# Patient Record
Sex: FEMALE | Race: BLACK OR AFRICAN AMERICAN | NOT HISPANIC OR LATINO | Employment: UNEMPLOYED
[De-identification: names, ages, dates, MRNs, and addresses within clinical notes are randomized per-mention and may not be internally consistent; named-entity substitution may affect disease eponyms.]

---

## 2017-11-12 ENCOUNTER — HEALTH MAINTENANCE LETTER (OUTPATIENT)
Age: 37
End: 2017-11-12

## 2018-11-19 ENCOUNTER — HEALTH MAINTENANCE LETTER (OUTPATIENT)
Age: 38
End: 2018-11-19

## 2024-10-31 ENCOUNTER — APPOINTMENT (OUTPATIENT)
Dept: CARDIOLOGY | Facility: CLINIC | Age: 44
DRG: 064 | End: 2024-10-31
Attending: NURSE PRACTITIONER
Payer: MEDICARE

## 2024-10-31 ENCOUNTER — APPOINTMENT (OUTPATIENT)
Dept: GENERAL RADIOLOGY | Facility: CLINIC | Age: 44
DRG: 064 | End: 2024-10-31
Payer: MEDICARE

## 2024-10-31 ENCOUNTER — APPOINTMENT (OUTPATIENT)
Dept: CT IMAGING | Facility: CLINIC | Age: 44
DRG: 064 | End: 2024-10-31
Attending: EMERGENCY MEDICINE
Payer: MEDICARE

## 2024-10-31 ENCOUNTER — APPOINTMENT (OUTPATIENT)
Dept: SPEECH THERAPY | Facility: CLINIC | Age: 44
DRG: 064 | End: 2024-10-31
Attending: NURSE PRACTITIONER
Payer: MEDICARE

## 2024-10-31 ENCOUNTER — HOSPITAL ENCOUNTER (INPATIENT)
Facility: CLINIC | Age: 44
LOS: 1 days | Discharge: HOME HEALTH | DRG: 064 | End: 2024-11-01
Attending: PSYCHIATRY & NEUROLOGY | Admitting: PSYCHIATRY & NEUROLOGY
Payer: MEDICARE

## 2024-10-31 ENCOUNTER — HOSPITAL ENCOUNTER (EMERGENCY)
Facility: CLINIC | Age: 44
Discharge: OTHER INSTITUTION | DRG: 064 | End: 2024-10-31
Attending: EMERGENCY MEDICINE | Admitting: EMERGENCY MEDICINE
Payer: MEDICARE

## 2024-10-31 ENCOUNTER — APPOINTMENT (OUTPATIENT)
Dept: GENERAL RADIOLOGY | Facility: CLINIC | Age: 44
DRG: 064 | End: 2024-10-31
Attending: NURSE PRACTITIONER
Payer: MEDICARE

## 2024-10-31 ENCOUNTER — APPOINTMENT (OUTPATIENT)
Dept: MRI IMAGING | Facility: CLINIC | Age: 44
DRG: 064 | End: 2024-10-31
Attending: NURSE PRACTITIONER
Payer: MEDICARE

## 2024-10-31 VITALS
HEART RATE: 112 BPM | TEMPERATURE: 97.6 F | DIASTOLIC BLOOD PRESSURE: 82 MMHG | RESPIRATION RATE: 15 BRPM | OXYGEN SATURATION: 96 % | SYSTOLIC BLOOD PRESSURE: 136 MMHG

## 2024-10-31 DIAGNOSIS — E78.5 HYPERLIPIDEMIA, UNSPECIFIED HYPERLIPIDEMIA TYPE: ICD-10-CM

## 2024-10-31 DIAGNOSIS — I61.9 INTRAPARENCHYMAL HEMORRHAGE OF BRAIN (H): Primary | ICD-10-CM

## 2024-10-31 DIAGNOSIS — I63.81 THALAMIC STROKE (H): ICD-10-CM

## 2024-10-31 DIAGNOSIS — I10 HYPERTENSION GOAL BP (BLOOD PRESSURE) < 140/90: ICD-10-CM

## 2024-10-31 DIAGNOSIS — I61.9 HEMORRHAGIC STROKE (H): ICD-10-CM

## 2024-10-31 PROBLEM — H16.143 SUPERFICIAL PUNCTATE KERATITIS OF BOTH EYES: Status: ACTIVE | Noted: 2024-03-19

## 2024-10-31 PROBLEM — N93.9 ABNORMAL UTERINE BLEEDING (AUB): Status: ACTIVE | Noted: 2021-10-21

## 2024-10-31 PROBLEM — I69.119: Status: ACTIVE | Noted: 2017-08-08

## 2024-10-31 PROBLEM — H50.10 EXOTROPIA: Status: ACTIVE | Noted: 2024-03-19

## 2024-10-31 PROBLEM — Z97.5 IUD (INTRAUTERINE DEVICE) IN PLACE: Status: ACTIVE | Noted: 2020-06-21

## 2024-10-31 PROBLEM — E66.9 TYPE 2 DIABETES MELLITUS WITH OBESITY (H): Status: ACTIVE | Noted: 2022-07-11

## 2024-10-31 PROBLEM — E11.69 TYPE 2 DIABETES MELLITUS WITH OBESITY (H): Status: ACTIVE | Noted: 2022-07-11

## 2024-10-31 PROBLEM — N85.01 SIMPLE ENDOMETRIAL HYPERPLASIA: Status: ACTIVE | Noted: 2020-03-11

## 2024-10-31 LAB
AMPHETAMINES UR QL SCN: NORMAL
ANION GAP SERPL CALCULATED.3IONS-SCNC: 11 MMOL/L (ref 7–15)
ANION GAP SERPL CALCULATED.3IONS-SCNC: 13 MMOL/L (ref 7–15)
ATRIAL RATE - MUSE: 118 BPM
ATRIAL RATE - MUSE: 68 BPM
BARBITURATES UR QL SCN: NORMAL
BASOPHILS # BLD AUTO: 0.1 10E3/UL (ref 0–0.2)
BASOPHILS NFR BLD AUTO: 1 %
BENZODIAZ UR QL SCN: NORMAL
BUN SERPL-MCNC: 11.6 MG/DL (ref 6–20)
BUN SERPL-MCNC: 8.2 MG/DL (ref 6–20)
BZE UR QL SCN: NORMAL
CALCIUM SERPL-MCNC: 8.8 MG/DL (ref 8.8–10.4)
CALCIUM SERPL-MCNC: 9.2 MG/DL (ref 8.8–10.4)
CANNABINOIDS UR QL SCN: NORMAL
CHLORIDE SERPL-SCNC: 101 MMOL/L (ref 98–107)
CHLORIDE SERPL-SCNC: 104 MMOL/L (ref 98–107)
CREAT SERPL-MCNC: 0.6 MG/DL (ref 0.51–0.95)
CREAT SERPL-MCNC: 0.75 MG/DL (ref 0.51–0.95)
DIASTOLIC BLOOD PRESSURE - MUSE: NORMAL MMHG
DIASTOLIC BLOOD PRESSURE - MUSE: NORMAL MMHG
EGFRCR SERPLBLD CKD-EPI 2021: >90 ML/MIN/1.73M2
EGFRCR SERPLBLD CKD-EPI 2021: >90 ML/MIN/1.73M2
EOSINOPHIL # BLD AUTO: 0.1 10E3/UL (ref 0–0.7)
EOSINOPHIL NFR BLD AUTO: 1 %
ERYTHROCYTE [DISTWIDTH] IN BLOOD BY AUTOMATED COUNT: 12.9 % (ref 10–15)
EST. AVERAGE GLUCOSE BLD GHB EST-MCNC: 177 MG/DL
FENTANYL UR QL: NORMAL
GLUCOSE BLDC GLUCOMTR-MCNC: 150 MG/DL (ref 70–99)
GLUCOSE BLDC GLUCOMTR-MCNC: 154 MG/DL (ref 70–99)
GLUCOSE BLDC GLUCOMTR-MCNC: 180 MG/DL (ref 70–99)
GLUCOSE BLDC GLUCOMTR-MCNC: 216 MG/DL (ref 70–99)
GLUCOSE SERPL-MCNC: 156 MG/DL (ref 70–99)
GLUCOSE SERPL-MCNC: 191 MG/DL (ref 70–99)
HBA1C MFR BLD: 7.8 %
HCO3 SERPL-SCNC: 20 MMOL/L (ref 22–29)
HCO3 SERPL-SCNC: 25 MMOL/L (ref 22–29)
HCT VFR BLD AUTO: 43.1 % (ref 35–47)
HGB BLD-MCNC: 14.2 G/DL (ref 11.7–15.7)
HOLD SPECIMEN: NORMAL
IMM GRANULOCYTES # BLD: 0 10E3/UL
IMM GRANULOCYTES NFR BLD: 0 %
INTERPRETATION ECG - MUSE: NORMAL
INTERPRETATION ECG - MUSE: NORMAL
LVEF ECHO: NORMAL
LYMPHOCYTES # BLD AUTO: 3.2 10E3/UL (ref 0.8–5.3)
LYMPHOCYTES NFR BLD AUTO: 40 %
MAGNESIUM SERPL-MCNC: 1.8 MG/DL (ref 1.7–2.3)
MAGNESIUM SERPL-MCNC: 2.2 MG/DL (ref 1.7–2.3)
MCH RBC QN AUTO: 29.4 PG (ref 26.5–33)
MCHC RBC AUTO-ENTMCNC: 32.9 G/DL (ref 31.5–36.5)
MCV RBC AUTO: 89 FL (ref 78–100)
MONOCYTES # BLD AUTO: 0.6 10E3/UL (ref 0–1.3)
MONOCYTES NFR BLD AUTO: 8 %
NEUTROPHILS # BLD AUTO: 3.9 10E3/UL (ref 1.6–8.3)
NEUTROPHILS NFR BLD AUTO: 49 %
NRBC # BLD AUTO: 0 10E3/UL
NRBC BLD AUTO-RTO: 0 /100
OPIATES UR QL SCN: NORMAL
P AXIS - MUSE: 33 DEGREES
P AXIS - MUSE: 53 DEGREES
PCP QUAL URINE (ROCHE): NORMAL
PHOSPHATE SERPL-MCNC: 2.7 MG/DL (ref 2.5–4.5)
PLATELET # BLD AUTO: 377 10E3/UL (ref 150–450)
POTASSIUM SERPL-SCNC: 3.2 MMOL/L (ref 3.4–5.3)
POTASSIUM SERPL-SCNC: 3.5 MMOL/L (ref 3.4–5.3)
POTASSIUM SERPL-SCNC: 3.8 MMOL/L (ref 3.4–5.3)
PR INTERVAL - MUSE: 160 MS
PR INTERVAL - MUSE: 172 MS
QRS DURATION - MUSE: 68 MS
QRS DURATION - MUSE: 76 MS
QT - MUSE: 332 MS
QT - MUSE: 418 MS
QTC - MUSE: 444 MS
QTC - MUSE: 465 MS
R AXIS - MUSE: -12 DEGREES
R AXIS - MUSE: 16 DEGREES
RBC # BLD AUTO: 4.83 10E6/UL (ref 3.8–5.2)
SODIUM SERPL-SCNC: 137 MMOL/L (ref 135–145)
SODIUM SERPL-SCNC: 137 MMOL/L (ref 135–145)
SYSTOLIC BLOOD PRESSURE - MUSE: NORMAL MMHG
SYSTOLIC BLOOD PRESSURE - MUSE: NORMAL MMHG
T AXIS - MUSE: 15 DEGREES
T AXIS - MUSE: 15 DEGREES
VENTRICULAR RATE- MUSE: 118 BPM
VENTRICULAR RATE- MUSE: 68 BPM
WBC # BLD AUTO: 7.8 10E3/UL (ref 4–11)

## 2024-10-31 PROCEDURE — 250N000013 HC RX MED GY IP 250 OP 250 PS 637: Performed by: PSYCHIATRY & NEUROLOGY

## 2024-10-31 PROCEDURE — 80048 BASIC METABOLIC PNL TOTAL CA: CPT | Performed by: PSYCHIATRY & NEUROLOGY

## 2024-10-31 PROCEDURE — 99292 CRITICAL CARE ADDL 30 MIN: CPT

## 2024-10-31 PROCEDURE — 36415 COLL VENOUS BLD VENIPUNCTURE: CPT | Performed by: EMERGENCY MEDICINE

## 2024-10-31 PROCEDURE — 96375 TX/PRO/DX INJ NEW DRUG ADDON: CPT

## 2024-10-31 PROCEDURE — 82947 ASSAY GLUCOSE BLOOD QUANT: CPT | Performed by: EMERGENCY MEDICINE

## 2024-10-31 PROCEDURE — 258N000001 HC RX 258: Performed by: INTERNAL MEDICINE

## 2024-10-31 PROCEDURE — 255N000002 HC RX 255 OP 636: Performed by: INTERNAL MEDICINE

## 2024-10-31 PROCEDURE — 96374 THER/PROPH/DIAG INJ IV PUSH: CPT | Mod: 59

## 2024-10-31 PROCEDURE — 92526 ORAL FUNCTION THERAPY: CPT | Mod: GN

## 2024-10-31 PROCEDURE — 250N000011 HC RX IP 250 OP 636: Performed by: EMERGENCY MEDICINE

## 2024-10-31 PROCEDURE — 70250 X-RAY EXAM OF SKULL: CPT | Mod: 26 | Performed by: RADIOLOGY

## 2024-10-31 PROCEDURE — 70553 MRI BRAIN STEM W/O & W/DYE: CPT | Mod: MG

## 2024-10-31 PROCEDURE — 250N000012 HC RX MED GY IP 250 OP 636 PS 637: Performed by: NURSE PRACTITIONER

## 2024-10-31 PROCEDURE — 85025 COMPLETE CBC W/AUTO DIFF WBC: CPT | Performed by: EMERGENCY MEDICINE

## 2024-10-31 PROCEDURE — 70450 CT HEAD/BRAIN W/O DYE: CPT | Mod: MA

## 2024-10-31 PROCEDURE — 93306 TTE W/DOPPLER COMPLETE: CPT | Mod: 26 | Performed by: INTERNAL MEDICINE

## 2024-10-31 PROCEDURE — 200N000002 HC R&B ICU UMMC

## 2024-10-31 PROCEDURE — 83735 ASSAY OF MAGNESIUM: CPT | Performed by: PSYCHIATRY & NEUROLOGY

## 2024-10-31 PROCEDURE — 93005 ELECTROCARDIOGRAM TRACING: CPT

## 2024-10-31 PROCEDURE — 70553 MRI BRAIN STEM W/O & W/DYE: CPT | Mod: 26 | Performed by: RADIOLOGY

## 2024-10-31 PROCEDURE — 255N000002 HC RX 255 OP 636: Performed by: NURSE PRACTITIONER

## 2024-10-31 PROCEDURE — 84100 ASSAY OF PHOSPHORUS: CPT | Performed by: PSYCHIATRY & NEUROLOGY

## 2024-10-31 PROCEDURE — 82962 GLUCOSE BLOOD TEST: CPT

## 2024-10-31 PROCEDURE — 250N000009 HC RX 250: Performed by: EMERGENCY MEDICINE

## 2024-10-31 PROCEDURE — 80307 DRUG TEST PRSMV CHEM ANLYZR: CPT | Performed by: NURSE PRACTITIONER

## 2024-10-31 PROCEDURE — A9585 GADOBUTROL INJECTION: HCPCS | Performed by: NURSE PRACTITIONER

## 2024-10-31 PROCEDURE — 74018 RADEX ABDOMEN 1 VIEW: CPT | Mod: 26 | Performed by: RADIOLOGY

## 2024-10-31 PROCEDURE — 999N000208 ECHOCARDIOGRAM COMPLETE

## 2024-10-31 PROCEDURE — 250N000013 HC RX MED GY IP 250 OP 250 PS 637: Performed by: NURSE PRACTITIONER

## 2024-10-31 PROCEDURE — G1010 CDSM STANSON: HCPCS | Performed by: RADIOLOGY

## 2024-10-31 PROCEDURE — 83036 HEMOGLOBIN GLYCOSYLATED A1C: CPT | Performed by: NURSE PRACTITIONER

## 2024-10-31 PROCEDURE — 71045 X-RAY EXAM CHEST 1 VIEW: CPT | Mod: 26 | Performed by: RADIOLOGY

## 2024-10-31 PROCEDURE — 74018 RADEX ABDOMEN 1 VIEW: CPT

## 2024-10-31 PROCEDURE — 250N000011 HC RX IP 250 OP 636: Performed by: NURSE PRACTITIONER

## 2024-10-31 PROCEDURE — 92610 EVALUATE SWALLOWING FUNCTION: CPT | Mod: GN

## 2024-10-31 PROCEDURE — 80048 BASIC METABOLIC PNL TOTAL CA: CPT | Performed by: EMERGENCY MEDICINE

## 2024-10-31 PROCEDURE — 70496 CT ANGIOGRAPHY HEAD: CPT | Mod: MA

## 2024-10-31 PROCEDURE — 36415 COLL VENOUS BLD VENIPUNCTURE: CPT | Performed by: PSYCHIATRY & NEUROLOGY

## 2024-10-31 PROCEDURE — 999N000248 HC STATISTIC IV INSERT WITH US BY RN

## 2024-10-31 PROCEDURE — 74018 RADEX ABDOMEN 1 VIEW: CPT | Mod: 26 | Performed by: STUDENT IN AN ORGANIZED HEALTH CARE EDUCATION/TRAINING PROGRAM

## 2024-10-31 PROCEDURE — 99291 CRITICAL CARE FIRST HOUR: CPT

## 2024-10-31 PROCEDURE — 83735 ASSAY OF MAGNESIUM: CPT | Performed by: EMERGENCY MEDICINE

## 2024-10-31 PROCEDURE — 74018 RADEX ABDOMEN 1 VIEW: CPT | Mod: 76

## 2024-10-31 PROCEDURE — 84132 ASSAY OF SERUM POTASSIUM: CPT | Performed by: PSYCHIATRY & NEUROLOGY

## 2024-10-31 RX ORDER — CITALOPRAM HYDROBROMIDE 20 MG/1
20 TABLET ORAL DAILY
Status: ON HOLD | COMMUNITY
End: 2024-10-31

## 2024-10-31 RX ORDER — POTASSIUM CHLORIDE 20MEQ/15ML
40 LIQUID (ML) ORAL ONCE
Status: COMPLETED | OUTPATIENT
Start: 2024-10-31 | End: 2024-10-31

## 2024-10-31 RX ORDER — CARVEDILOL 12.5 MG/1
12.5 TABLET ORAL 2 TIMES DAILY WITH MEALS
Status: DISCONTINUED | OUTPATIENT
Start: 2024-10-31 | End: 2024-11-01 | Stop reason: HOSPADM

## 2024-10-31 RX ORDER — IOPAMIDOL 755 MG/ML
500 INJECTION, SOLUTION INTRAVASCULAR ONCE
Status: COMPLETED | OUTPATIENT
Start: 2024-10-31 | End: 2024-10-31

## 2024-10-31 RX ORDER — CITALOPRAM HYDROBROMIDE 20 MG/1
20 TABLET ORAL DAILY
COMMUNITY

## 2024-10-31 RX ORDER — ATORVASTATIN CALCIUM 10 MG/1
10 TABLET, FILM COATED ORAL DAILY
Status: ON HOLD | COMMUNITY
End: 2024-11-01

## 2024-10-31 RX ORDER — ACETAMINOPHEN 325 MG/1
650 TABLET ORAL EVERY 4 HOURS PRN
Status: DISCONTINUED | OUTPATIENT
Start: 2024-10-31 | End: 2024-11-01 | Stop reason: HOSPADM

## 2024-10-31 RX ORDER — ESCITALOPRAM OXALATE 10 MG/1
10 TABLET ORAL DAILY
Status: ON HOLD | COMMUNITY
End: 2024-10-31

## 2024-10-31 RX ORDER — MAGNESIUM OXIDE 400 MG/1
400 TABLET ORAL EVERY 4 HOURS
Status: COMPLETED | OUTPATIENT
Start: 2024-10-31 | End: 2024-10-31

## 2024-10-31 RX ORDER — ONDANSETRON 2 MG/ML
4 INJECTION INTRAMUSCULAR; INTRAVENOUS EVERY 6 HOURS PRN
Status: DISCONTINUED | OUTPATIENT
Start: 2024-10-31 | End: 2024-11-01 | Stop reason: HOSPADM

## 2024-10-31 RX ORDER — BISACODYL 5 MG
5 TABLET, DELAYED RELEASE (ENTERIC COATED) ORAL DAILY PRN
Status: DISCONTINUED | OUTPATIENT
Start: 2024-10-31 | End: 2024-11-01 | Stop reason: HOSPADM

## 2024-10-31 RX ORDER — ESCITALOPRAM OXALATE 10 MG/1
10 TABLET ORAL DAILY
Status: DISCONTINUED | OUTPATIENT
Start: 2024-10-31 | End: 2024-10-31

## 2024-10-31 RX ORDER — LABETALOL HYDROCHLORIDE 5 MG/ML
10 INJECTION, SOLUTION INTRAVENOUS EVERY 10 MIN PRN
Status: DISCONTINUED | OUTPATIENT
Start: 2024-10-31 | End: 2024-11-01 | Stop reason: HOSPADM

## 2024-10-31 RX ORDER — FLUCONAZOLE 150 MG/1
150 TABLET ORAL ONCE
Status: COMPLETED | OUTPATIENT
Start: 2024-10-31 | End: 2024-10-31

## 2024-10-31 RX ORDER — FAMOTIDINE 20 MG/1
20 TABLET, FILM COATED ORAL 2 TIMES DAILY
Status: DISCONTINUED | OUTPATIENT
Start: 2024-10-31 | End: 2024-11-01

## 2024-10-31 RX ORDER — ACYCLOVIR 200 MG/1
3 CAPSULE ORAL ONCE
Status: COMPLETED | OUTPATIENT
Start: 2024-10-31 | End: 2024-10-31

## 2024-10-31 RX ORDER — HYDRALAZINE HYDROCHLORIDE 20 MG/ML
10 INJECTION INTRAMUSCULAR; INTRAVENOUS EVERY 30 MIN PRN
Status: DISCONTINUED | OUTPATIENT
Start: 2024-10-31 | End: 2024-11-01 | Stop reason: HOSPADM

## 2024-10-31 RX ORDER — AMOXICILLIN 250 MG
1 CAPSULE ORAL 2 TIMES DAILY PRN
Status: DISCONTINUED | OUTPATIENT
Start: 2024-10-31 | End: 2024-10-31

## 2024-10-31 RX ORDER — LABETALOL HYDROCHLORIDE 5 MG/ML
10 INJECTION, SOLUTION INTRAVENOUS EVERY 10 MIN PRN
Status: DISCONTINUED | OUTPATIENT
Start: 2024-10-31 | End: 2024-10-31 | Stop reason: HOSPADM

## 2024-10-31 RX ORDER — CITALOPRAM HYDROBROMIDE 10 MG/1
20 TABLET ORAL DAILY
Status: DISCONTINUED | OUTPATIENT
Start: 2024-10-31 | End: 2024-11-01 | Stop reason: HOSPADM

## 2024-10-31 RX ORDER — GADOBUTROL 604.72 MG/ML
7.5 INJECTION INTRAVENOUS ONCE
Status: COMPLETED | OUTPATIENT
Start: 2024-10-31 | End: 2024-10-31

## 2024-10-31 RX ORDER — AMLODIPINE BESYLATE 10 MG/1
10 TABLET ORAL DAILY
Status: DISCONTINUED | OUTPATIENT
Start: 2024-10-31 | End: 2024-11-01 | Stop reason: HOSPADM

## 2024-10-31 RX ORDER — ACETAMINOPHEN 650 MG/1
650 SUPPOSITORY RECTAL EVERY 4 HOURS PRN
Status: DISCONTINUED | OUTPATIENT
Start: 2024-10-31 | End: 2024-10-31

## 2024-10-31 RX ORDER — POLYETHYLENE GLYCOL 3350 17 G/17G
17 POWDER, FOR SOLUTION ORAL DAILY
Status: DISCONTINUED | OUTPATIENT
Start: 2024-10-31 | End: 2024-11-01 | Stop reason: HOSPADM

## 2024-10-31 RX ORDER — ONDANSETRON 4 MG/1
4 TABLET, ORALLY DISINTEGRATING ORAL EVERY 6 HOURS PRN
Status: DISCONTINUED | OUTPATIENT
Start: 2024-10-31 | End: 2024-11-01 | Stop reason: HOSPADM

## 2024-10-31 RX ORDER — ATORVASTATIN CALCIUM 10 MG/1
10 TABLET, FILM COATED ORAL EVERY EVENING
Status: DISCONTINUED | OUTPATIENT
Start: 2024-10-31 | End: 2024-11-01

## 2024-10-31 RX ORDER — AMOXICILLIN 250 MG
1 CAPSULE ORAL 2 TIMES DAILY
Status: DISCONTINUED | OUTPATIENT
Start: 2024-10-31 | End: 2024-11-01 | Stop reason: HOSPADM

## 2024-10-31 RX ORDER — NICOTINE POLACRILEX 4 MG
15-30 LOZENGE BUCCAL
Status: DISCONTINUED | OUTPATIENT
Start: 2024-10-31 | End: 2024-11-01

## 2024-10-31 RX ORDER — AMLODIPINE BESYLATE 5 MG/1
5 TABLET ORAL DAILY
Status: ON HOLD | COMMUNITY
End: 2024-11-01

## 2024-10-31 RX ORDER — AMOXICILLIN 250 MG
2 CAPSULE ORAL 2 TIMES DAILY PRN
Status: DISCONTINUED | OUTPATIENT
Start: 2024-10-31 | End: 2024-10-31

## 2024-10-31 RX ORDER — PROCHLORPERAZINE 25 MG
25 SUPPOSITORY, RECTAL RECTAL EVERY 12 HOURS PRN
Status: DISCONTINUED | OUTPATIENT
Start: 2024-10-31 | End: 2024-11-01 | Stop reason: HOSPADM

## 2024-10-31 RX ORDER — PROCHLORPERAZINE MALEATE 5 MG/1
10 TABLET ORAL EVERY 6 HOURS PRN
Status: DISCONTINUED | OUTPATIENT
Start: 2024-10-31 | End: 2024-11-01 | Stop reason: HOSPADM

## 2024-10-31 RX ORDER — DEXTROSE MONOHYDRATE 25 G/50ML
25-50 INJECTION, SOLUTION INTRAVENOUS
Status: DISCONTINUED | OUTPATIENT
Start: 2024-10-31 | End: 2024-11-01

## 2024-10-31 RX ORDER — HYDRALAZINE HYDROCHLORIDE 20 MG/ML
10 INJECTION INTRAMUSCULAR; INTRAVENOUS EVERY 10 MIN PRN
Status: DISCONTINUED | OUTPATIENT
Start: 2024-10-31 | End: 2024-10-31

## 2024-10-31 RX ORDER — ONDANSETRON 2 MG/ML
4 INJECTION INTRAMUSCULAR; INTRAVENOUS ONCE
Status: COMPLETED | OUTPATIENT
Start: 2024-10-31 | End: 2024-10-31

## 2024-10-31 RX ORDER — METOPROLOL SUCCINATE 100 MG/1
100 TABLET, EXTENDED RELEASE ORAL DAILY
Status: ON HOLD | COMMUNITY
End: 2024-11-01

## 2024-10-31 RX ORDER — ACETAMINOPHEN 325 MG/10.15ML
650 LIQUID ORAL EVERY 4 HOURS PRN
Status: DISCONTINUED | OUTPATIENT
Start: 2024-10-31 | End: 2024-11-01 | Stop reason: HOSPADM

## 2024-10-31 RX ADMIN — FAMOTIDINE 20 MG: 20 TABLET ORAL at 12:23

## 2024-10-31 RX ADMIN — CARVEDILOL 12.5 MG: 12.5 TABLET, FILM COATED ORAL at 12:23

## 2024-10-31 RX ADMIN — FAMOTIDINE 20 MG: 20 TABLET ORAL at 19:53

## 2024-10-31 RX ADMIN — IOPAMIDOL 67 ML: 755 INJECTION, SOLUTION INTRAVENOUS at 05:19

## 2024-10-31 RX ADMIN — NICARDIPINE HYDROCHLORIDE 15 MG/HR: 0.2 INJECTION INTRAVENOUS at 09:01

## 2024-10-31 RX ADMIN — NICARDIPINE HYDROCHLORIDE 10 MG/HR: 0.2 INJECTION INTRAVENOUS at 13:37

## 2024-10-31 RX ADMIN — MAGNESIUM OXIDE TAB 400 MG (241.3 MG ELEMENTAL MG) 400 MG: 400 (241.3 MG) TAB at 14:49

## 2024-10-31 RX ADMIN — ATORVASTATIN CALCIUM 10 MG: 10 TABLET, FILM COATED ORAL at 19:53

## 2024-10-31 RX ADMIN — SENNOSIDES AND DOCUSATE SODIUM 1 TABLET: 8.6; 5 TABLET ORAL at 19:53

## 2024-10-31 RX ADMIN — ONDANSETRON 4 MG: 2 INJECTION INTRAMUSCULAR; INTRAVENOUS at 09:04

## 2024-10-31 RX ADMIN — SODIUM CHLORIDE 3 ML: 9 INJECTION INTRAMUSCULAR; INTRAVENOUS; SUBCUTANEOUS at 14:44

## 2024-10-31 RX ADMIN — CARVEDILOL 12.5 MG: 12.5 TABLET, FILM COATED ORAL at 19:53

## 2024-10-31 RX ADMIN — MAGNESIUM OXIDE TAB 400 MG (241.3 MG ELEMENTAL MG) 400 MG: 400 (241.3 MG) TAB at 18:48

## 2024-10-31 RX ADMIN — AMLODIPINE BESYLATE 10 MG: 10 TABLET ORAL at 12:24

## 2024-10-31 RX ADMIN — SENNOSIDES AND DOCUSATE SODIUM 1 TABLET: 8.6; 5 TABLET ORAL at 12:23

## 2024-10-31 RX ADMIN — POTASSIUM & SODIUM PHOSPHATES POWDER PACK 280-160-250 MG 1 PACKET: 280-160-250 PACK at 17:26

## 2024-10-31 RX ADMIN — PERFLUTREN 6 ML: 6.52 INJECTION, SUSPENSION INTRAVENOUS at 14:05

## 2024-10-31 RX ADMIN — INSULIN ASPART 2 UNITS: 100 INJECTION, SOLUTION INTRAVENOUS; SUBCUTANEOUS at 14:42

## 2024-10-31 RX ADMIN — FLUCONAZOLE 150 MG: 150 TABLET ORAL at 14:44

## 2024-10-31 RX ADMIN — NICARDIPINE HYDROCHLORIDE 5 MG/HR: 0.2 INJECTION INTRAVENOUS at 06:15

## 2024-10-31 RX ADMIN — CITALOPRAM HYDROBROMIDE 20 MG: 10 TABLET ORAL at 12:23

## 2024-10-31 RX ADMIN — POLYETHYLENE GLYCOL 3350 17 G: 17 POWDER, FOR SOLUTION ORAL at 12:23

## 2024-10-31 RX ADMIN — POTASSIUM CHLORIDE 40 MEQ: 20 SOLUTION ORAL at 14:49

## 2024-10-31 RX ADMIN — GADOBUTROL 7 ML: 604.72 INJECTION INTRAVENOUS at 13:27

## 2024-10-31 RX ADMIN — SODIUM CHLORIDE 80 ML: 9 INJECTION, SOLUTION INTRAVENOUS at 05:19

## 2024-10-31 RX ADMIN — POTASSIUM & SODIUM PHOSPHATES POWDER PACK 280-160-250 MG 1 PACKET: 280-160-250 PACK at 19:53

## 2024-10-31 ASSESSMENT — ACTIVITIES OF DAILY LIVING (ADL)
ADLS_ACUITY_SCORE: 0

## 2024-10-31 ASSESSMENT — COLUMBIA-SUICIDE SEVERITY RATING SCALE - C-SSRS
6. HAVE YOU EVER DONE ANYTHING, STARTED TO DO ANYTHING, OR PREPARED TO DO ANYTHING TO END YOUR LIFE?: NO
2. HAVE YOU ACTUALLY HAD ANY THOUGHTS OF KILLING YOURSELF IN THE PAST MONTH?: NO
1. IN THE PAST MONTH, HAVE YOU WISHED YOU WERE DEAD OR WISHED YOU COULD GO TO SLEEP AND NOT WAKE UP?: NO

## 2024-10-31 NOTE — ED PROVIDER NOTES
History     Chief Complaint:  One-sided Weakness (/)       HPI   Trinh Dobson is a 44 year old female     3 to 4 days of paresthesias, right-sided, initially more in the right foot and lower extremity and now yesterday and today more noticeable in the right hand and arm.  No severe HA.  No vision change.  + slight assoc feeling of being off balance (had to get a walker out 1 week ago.)    No associated fever, chills, vomiting, diarrhea, dysuria frequency urgency.      No preceding falls or trauma.  She states she did have a ground-level fall today however it sounds more like she lowered herself to the ground.    She was at an outside emergency department on the 29th () for similar complaints had blood test showing showing hypokalemia.  Had urinalysis, CBC, BMP, ECG.  Urinalysis showed no signs of infection.  CBC unremarkable.  Potassium was 3.0.  Troponin was undetectable.    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      Independent Historian:       Review of External Notes:   I reviewed recent emergency department visit at [facility] on 29 October.      Medications:    clomiPHENE (CLOMID) 50 MG tablet  labetalol (NORMODYNE) 100 MG tablet        Past Medical History:    Past Medical History:   Diagnosis Date    Hypertension        Past Surgical History:    Past Surgical History:   Procedure Laterality Date    IR CAROTID CEREBRAL ANGIOGRAM BILATERAL  8/11/2016    IR CAROTID CEREBRAL ANGIOGRAM BILATERAL  7/23/2015        Physical Exam   Patient Vitals for the past 24 hrs:   BP Temp Temp src Pulse Resp SpO2   10/31/24 0615 (!) 169/91 -- -- 79 -- 100 %   10/31/24 0611 (!) 181/97 -- -- 78 -- 91 %   10/31/24 0600 (!) 185/113 -- -- 73 -- 100 %   10/31/24 0439 (!) 187/108 97.6  F (36.4  C) Temporal 71 18 96 %          HENT:  mmm, no rhinorrhea  Eyes: periorbital tissues and sclera normal, right pupil 4 mm, left pupil 3 mm, no  nystagmus, no obvious extraocular muscle deficit,   Neck: supple, no abnormal swelling, painless range of motion  Lungs:  CTAB,  no resp distress  CV: rrr, no m/r/g, ppi  Abd: soft, nontender, nondistended, no rebound/masses/guarding/hsm  Ext: no peripheral edema, no major joint effusion  Skin: warm, dry, well perfused, no rashes/bruising/lesions on exposed skin  Neuro: alert, speech clear, face symmetric, 5 out of 5  strength 5 upper extremities, 5 out of 5 flexion extension at the elbow for bilateral upper extremities, 5 out of 5 motor plantarflexion dorsiflexion bilateral lower extremities able to form straight leg raise bilateral lower extremities, sensation intact to light touch all 4 extremities.    She was up independently in the emergency room with a walker and walked to the bathroom.    Psych: Normal mood, normal affect    Emergency Department Course   ECG  ECG results from 10/31/24   EKG 12 lead     Value    Systolic Blood Pressure     Diastolic Blood Pressure     Ventricular Rate 68    Atrial Rate 68    WA Interval 172    QRS Duration 76        QTc 444    P Axis 33    R AXIS -12    T Axis 15    Interpretation ECG      Sinus rhythm  Minimal voltage criteria for LVH, may be normal variant ( R in aVL )  Nonspecific T wave abnormality  Abnormal ECG  When compared with ECG of 25-Mar-2015 22:49,  Nonspecific T wave abnormality now evident in Anterolateral leads               Imaging:  CTA Head Neck with Contrast   Final Result   IMPRESSION:    HEAD CT:   1.  Acute intraparenchymal hemorrhage in the left thalamus/internal capsule measuring 6 x 7 x 14 mm in diameter.   2.  Nondilated shunted ventricular configuration.   3.  Chronic changes, as described.      HEAD CTA:    1.  Irregular stenoses of the KIMBERLEE and MCA branches, which may reflect an atypical presentation of vasospasm given the presence of hemorrhage. Additional considerations would include RCVS, vasculitis, or less likely age-advanced  atherosclerosis.      NECK CTA:   1.  No hemodynamically significant stenosis in the neck vessels.    2.  No evidence for dissection.      Findings were discussed with Dr. Dennis at 5:46 AM on 10/31/2024.                        Head CT w/o contrast   Final Result   IMPRESSION:    HEAD CT:   1.  Acute intraparenchymal hemorrhage in the left thalamus/internal capsule measuring 6 x 7 x 14 mm in diameter.   2.  Nondilated shunted ventricular configuration.   3.  Chronic changes, as described.      HEAD CTA:    1.  Irregular stenoses of the KIMBERLEE and MCA branches, which may reflect an atypical presentation of vasospasm given the presence of hemorrhage. Additional considerations would include RCVS, vasculitis, or less likely age-advanced atherosclerosis.      NECK CTA:   1.  No hemodynamically significant stenosis in the neck vessels.    2.  No evidence for dissection.      Findings were discussed with Dr. Dennis at 5:46 AM on 10/31/2024.                               Laboratory:  Labs Ordered and Resulted from Time of ED Arrival to Time of ED Departure   GLUCOSE BY METER - Abnormal       Result Value    GLUCOSE BY METER POCT 180 (*)    BASIC METABOLIC PANEL - Abnormal    Sodium 137      Potassium 3.5      Chloride 101      Carbon Dioxide (CO2) 25      Anion Gap 11      Urea Nitrogen 11.6      Creatinine 0.75      GFR Estimate >90      Calcium 9.2      Glucose 156 (*)    MAGNESIUM - Normal    Magnesium 2.2     CBC WITH PLATELETS AND DIFFERENTIAL    WBC Count 7.8      RBC Count 4.83      Hemoglobin 14.2      Hematocrit 43.1      MCV 89      MCH 29.4      MCHC 32.9      RDW 12.9      Platelet Count 377      % Neutrophils 49      % Lymphocytes 40      % Monocytes 8      % Eosinophils 1      % Basophils 1      % Immature Granulocytes 0      NRBCs per 100 WBC 0      Absolute Neutrophils 3.9      Absolute Lymphocytes 3.2      Absolute Monocytes 0.6      Absolute Eosinophils 0.1      Absolute Basophils 0.1      Absolute Immature  Granulocytes 0.0      Absolute NRBCs 0.0          Procedures       Emergency Department Course & Assessments:    Interventions:  Medications   labetalol (NORMODYNE/TRANDATE) injection 10 mg (has no administration in time range)     Or   hydrALAZINE (APRESOLINE) injection 10 mg (has no administration in time range)   niCARdipine 40 mg in 200 mL NS (CARDENE) infusion (7.5 mg/hr Intravenous Rate/Dose Change 10/31/24 0646)   CT Scan Flush (80 mLs Intravenous $Given 10/31/24 0519)   iopamidol (ISOVUE-370) solution 500 mL (67 mLs Intravenous $Given 10/31/24 0519)        Assessments:      Independent Interpretation (X-rays, CTs, rhythm strip):  L thalamic hemorrhage     Consultations/Discussion of Management or Tests:  I spoke with [facility] regarding the CT scan: Left thalamic hemorrhagic infarct    Spoke with neurosurgery, no emergent surgical indication    Spoke with stroke neurology: aware, SBP goal 140.  Transfer [facility] as may need cerebral angiogram       ED Course as of 10/31/24 0647   Thu Oct 31, 2024   0500 ECG shows normal sinus rhythm, normal axis, no evidence of acute ischemia, no significantly abnormal intervals.  Read at 0500.       Social Determinants of Health affecting care:       Disposition:  Transfer     Impression & Plan    CMS Diagnoses:     The patient has stroke symptoms:         ED Stroke specific documentation           NIHSS PDF     Patient last known well time: > 72 hrs ago  ED Provider first to bedside at: ED arrival   CT Results received at: see epic report    Thrombolytics:   Not given due to:   - active bleeding    If treating with thrombolytics: Ensure SBP<180 and DBP<105 prior to treatment with thrombolytics.  Administering thrombolytics after treatment with IV labetalol, hydralazine, or nicardipine is reasonable once BP control is established.    Endovascular Retrieval:  Hemorrhagic stroke    National Institutes of Health Stroke Scale (Baseline)  Time Performed: arrival      Score    Level of consciousness: (0)   Alert, keenly responsive    LOC questions: (0)   Answers both questions correctly    LOC commands: (0)   Performs both tasks correctly    Best gaze: (0)   Normal    Visual: (0)   No visual loss    Facial palsy: (0)   Normal symmetrical movements    Motor arm (left): (0)   No drift    Motor arm (right): (0)   No drift    Motor leg (left): (0)   No drift    Motor leg (right): (0)   No drift    Limb ataxia: (0)   Absent    Sensory: (1)   Mild to moderate sensory loss    Best language: (0)   Normal- no aphasia    Dysarthria: (0)   Normal    Extinction and inattention: (0)   No abnormality        Total Score:  1            MIPS (If applicable):          Medical Decision Makin-year-old female with a history of basilar tip aneurysm rupture status post coiling not on anticoagulants here with at least 3 may be closer to 5 days of right-sided paresthesias and difficulty with balance found to have a left thalamic hemorrhagic stroke.  No dense motor deficits.  Was able to ambulate independently to the bathroom with a walker.  CTA portion of the scans in the emergency room note irregular stenoses of the KIMBERLEE and MCA branches.  Vasospasm versus RCVS.    Patient will need transfer to a higher level of care.  Will go for a systolic blood pressure of 1 40-1 60 and use nicardipine.      Repeat abdominal examination at 0 615: Face symmetric, speech easily understandable, 5 out of 5 symmetric motor bilateral upper extremities and bilateral lower extremities, subjective sensory deficit right upper and lower extremity, no object of sensory deficit.  No upper extremity or lower extremity drift.        Critical Care time was 34 minutes for this patient excluding procedures.      Diagnosis:    ICD-10-CM    1. Hemorrhagic stroke (H)  I61.9       2. Thalamic stroke (H)  I63.81                Dexter Dennis MD  10/31/2024   Dexter Dennis, *        Dexter Dennis MD  10/31/24  4528

## 2024-10-31 NOTE — CONSULTS
[facility]    Neurosurgery Consultation     Date of Admission:  10/31/2024  Date of Consult: 10/31/24    Assessment & Plan   Trinh Dobson is a 44 year old female who was admitted on 10/31/2024. I was asked to see the patient for left intraparenchymal hemorrhage of the left thalamus. She is left handed. She has a history of subarachnoid hemorrhage, CVA, epilepsy, and  shunt, and rupture of basilar tip aneurysm s/p clipping.     Active Problems:    * No active hospital problems. *      Plan:  We will sign off as patient will be admitted to [facility] for further care and evaluation  No neurosurgical intervention at this time        I have discussed the following assessment and plan with Dr. Guerra who is in agreement with initial plan and will follow up with further consultation recommendations.    Lior Le, CARIDAD, APRN, CNP  [facility] Neurosurgery  Tel [phone]         Code Status    Full Code    Reason for Consult   Reason for consult: I was asked by Dr Duron to evaluate this patient for left intraparenchymal hemorrhage of the left thalamus..    Primary Care Physician   Physician No Ref-Primary    Chief Complaint   Left intraparenchymal hemorrhage of the left thalamus.    History is obtained from the patient    History of Present Illness   Trinh Dobson is a 44 year old female who presents with left intraparenchymal hemorrhage of the left thalamus. She is left handed. She is alert and orientated x3 with difficult finding words. She states that 3-4 days ago she had some paresthesia in her right foot and right lower extremity. Yesterday she also develop sensations in her right hand along with increased difficulty walking so she came into the ED.         Denies changes in bowel and bladder habits. Denies saddle anesthesia.           Past Medical History   I have reviewed this patient's medical history and updated it with pertinent information if needed.   Past Medical History:   Diagnosis  Date    Hypertension     Intraparenchymal hemorrhage of brain (H) 10/31/2024       Past Surgical History   I have reviewed this patient's surgical history and updated it with pertinent information if needed.  Past Surgical History:   Procedure Laterality Date    IR CAROTID CEREBRAL ANGIOGRAM BILATERAL  8/11/2016    IR CAROTID CEREBRAL ANGIOGRAM BILATERAL  7/23/2015       Prior to Admission Medications   Prior to Admission Medications   Prescriptions Last Dose Informant Patient Reported? Taking?   clomiPHENE (CLOMID) 50 MG tablet   No No   Sig: Take 1 tablet by mouth daily.   labetalol (NORMODYNE) 100 MG tablet   No No   Sig: Take 0.5 tablets by mouth 2 times daily.      Facility-Administered Medications: None     Allergies   No Known Allergies    Social History   I have reviewed this patient's social history and updated it with pertinent information if needed. Trinh Dobson  reports that she has never smoked. She does not have any smokeless tobacco history on file. She reports that she does not drink alcohol and does not use drugs.    Family History   I have reviewed this patient's family history and updated it with pertinent information if needed.   No family history on file.    Review of Systems   14 point review of systems negative with exception to HPI     Physical Exam   Temp: 97.6  F (36.4  C) Temp src: Temporal BP: 136/82 Pulse: 112   Resp: 15 SpO2: 96 % O2 Device: None (Room air)    Vital Signs with Ranges  Temp:  [97.3  F (36.3  C)-97.6  F (36.4  C)] 97.3  F (36.3  C)  Pulse:  [] 122  Resp:  [15-20] 15  BP: (129-187)/() 146/91  SpO2:  [83 %-100 %] 96 %  0 lbs 0 oz     , Blood pressure 136/82, pulse 112, temperature 97.6  F (36.4  C), temperature source Temporal, resp. rate 15, SpO2 96%.  0 lbs 0 oz      HEENT:  Normocephalic, atraumatic.  PERRLA.  EOM s intact.   Neck:  Supple, non-tender, without lymphadenopathy.    NEUROLOGICAL EXAMINATION:   Mental Status: A & O in no acute distress.  Affect is  appropriate.  Speech is fluent.  Recent and remote memory are intact.  Attention span and concentration are normal.     Cranial nerves:  II-XII intact.     Cranial Nerves: CN1: grossly intact per patient recall. CN2: No funduscopic exam performed. CN3,4 & 6: Pupillary light response, lateral and vertical gaze normal.  No nystagmus.  Visual fields are full to confrontation. CN5: Intact to touch CN7: No facial weakness, smile, facial symmetry intact. CN8: Intact to spoken voice. CN9&10: Gag reflex, uvula midline, palate rises with phonation. CN11: Shoulder shrug 5/5 intact bilaterally. CN12: Tongue midline and moves freely from side to side.     Motor: No pronator drift of upper extremity. Normal bulk and tone all muscle groups of upper and lower extremities.  Deltoids:  Right: 5/5   Left:  5/5  Biceps:  Right: 5/5   Left:  5/5  Triceps: Right: 5/5   Left:  5/5                       Wrist Extensors: Right: 5/5   Left:  5/5        Wrist Flexors:Right: 5/5   Left:  5/5             Hand : Right: 5/5   Left:  5/5         Hip Flexor: Right: 5/5   Left:  5/5                 Knee extension :Right: 5/5   Left:  5/5               Knee flexion: Right: 5/5   Left:  5/5              Plantar flexion:Right: 5/5   Left:  5/5        dorsiflexion:Right: 5/5   Left:  5/5                        EHL:Right: 5/5   Left:  5/5                     Sensation: Decreased sensation in right hand.  Reflexes:  supinator, biceps, triceps, knee/ ankle jerk intact. No hoffmans/babinski/ clonus.  Coordination:  finger to nose, heel to shin, rapid alternating movements smooth and rhythmic.   Gait:  not tested    Cervical examination reveals good range of motion.  No tenderness to palpation of the cervical spine or paraspinous muscles bilaterally.     Lumbar examination reveals no tenderness of the spine or paraspinous muscles.  Hip height is symmetrical. Straight leg raise is negative bilaterally.       Imaging: Reviewed personally shows: left  intraparenchymal hemorrhage of the left thalamus.    Recent Results (from the past 24 hours)   Head CT w/o contrast    Narrative    EXAM: CTA HEAD NECK W CONTRAST, CT HEAD W/O CONTRAST  LOCATION: [facility]  DATE: 10/31/2024    INDICATION: 4 days RUE and RLE paresthesias, h o SAH, h o  shunt  COMPARISON: 3/25/2015  CONTRAST: 67mL Isovue 370  TECHNIQUE: Head and neck CT angiogram with IV contrast. Noncontrast head CT followed by axial helical CT images of the head and neck vessels obtained during the arterial phase of intravenous contrast administration. Axial 2D reconstructed images and   multiplanar 3D MIP reconstructed images of the head and neck vessels were performed by the technologist. Dose reduction techniques were used. All stenosis measurements made according to NASCET criteria unless otherwise specified.    FINDINGS:   NONCONTRAST HEAD CT:   INTRACRANIAL CONTENTS: Coil mass in the region of the basilar artery limits evaluation of adjacent structures. Small acute intraparenchymal hemorrhage in the left thalamus/internal capsule measuring approximately 6 x 7 x 14 mm in diameter. No midline   shift or effacement of the basal cisterns. Chronic encephalomalacia and gliosis in the right occipital lobe and left cerebellum. Right frontal ventriculostomy catheter terminates near the right foramen of Monro. The ventricles are slitlike in   configuration.     VISUALIZED ORBITS/SINUSES/MASTOIDS: No intraorbital abnormality. No paranasal sinus mucosal disease. No middle ear or mastoid effusion.    BONES/SOFT TISSUES: No acute abnormality.    HEAD CTA:  ANTERIOR CIRCULATION: No proximal occlusion, aneurysm, or high flow vascular malformation. High-grade stenoses in the KIMBERLEE branches with milder stenoses in the MCA branches. Standard [place] anatomy.    POSTERIOR CIRCULATION: No definite proximal occlusion, residual or recurrent aneurysm, or high flow vascular malformation. Dominant right  and smaller left vertebral artery contribute to a normal basilar artery.     DURAL VENOUS SINUSES: Expected enhancement of the major dural venous sinuses.    NECK CTA:  RIGHT CAROTID: No measurable stenosis or dissection.    LEFT CAROTID: No measurable stenosis or dissection.    VERTEBRAL ARTERIES: No focal stenosis or dissection. Dominant right and smaller left vertebral arteries.    AORTIC ARCH: Classic aortic arch anatomy with no significant stenosis at the origin of the great vessels.    NONVASCULAR STRUCTURES: 7 mm nodule in the left thyroid. Mild degenerative changes of the cervical spine, without high-grade canal stenosis. Visualized portions of the lungs are clear.      Impression    IMPRESSION:   HEAD CT:  1.  Acute intraparenchymal hemorrhage in the left thalamus/internal capsule measuring 6 x 7 x 14 mm in diameter.  2.  Nondilated shunted ventricular configuration.  3.  Chronic changes, as described.    HEAD CTA:   1.  Irregular stenoses of the KIMBERLEE and MCA branches, which may reflect an atypical presentation of vasospasm given the presence of hemorrhage. Additional considerations would include RCVS, vasculitis, or less likely age-advanced atherosclerosis.    NECK CTA:  1.  No hemodynamically significant stenosis in the neck vessels.   2.  No evidence for dissection.    Findings were discussed with Dr. Dennis at 5:46 AM on 10/31/2024.                CTA Head Neck with Contrast    Narrative    EXAM: CTA HEAD NECK W CONTRAST, CT HEAD W/O CONTRAST  LOCATION: [facility]  DATE: 10/31/2024    INDICATION: 4 days RUE and RLE paresthesias, h o SAH, h o  shunt  COMPARISON: 3/25/2015  CONTRAST: 67mL Isovue 370  TECHNIQUE: Head and neck CT angiogram with IV contrast. Noncontrast head CT followed by axial helical CT images of the head and neck vessels obtained during the arterial phase of intravenous contrast administration. Axial 2D reconstructed images and   multiplanar 3D MIP reconstructed  images of the head and neck vessels were performed by the technologist. Dose reduction techniques were used. All stenosis measurements made according to NASCET criteria unless otherwise specified.    FINDINGS:   NONCONTRAST HEAD CT:   INTRACRANIAL CONTENTS: Coil mass in the region of the basilar artery limits evaluation of adjacent structures. Small acute intraparenchymal hemorrhage in the left thalamus/internal capsule measuring approximately 6 x 7 x 14 mm in diameter. No midline   shift or effacement of the basal cisterns. Chronic encephalomalacia and gliosis in the right occipital lobe and left cerebellum. Right frontal ventriculostomy catheter terminates near the right foramen of [place]. The ventricles are slitlike in   configuration.     VISUALIZED ORBITS/SINUSES/MASTOIDS: No intraorbital abnormality. No paranasal sinus mucosal disease. No middle ear or mastoid effusion.    BONES/SOFT TISSUES: No acute abnormality.    HEAD CTA:  ANTERIOR CIRCULATION: No proximal occlusion, aneurysm, or high flow vascular malformation. High-grade stenoses in the KIMBERLEE branches with milder stenoses in the MCA branches. Standard [place] anatomy.    POSTERIOR CIRCULATION: No definite proximal occlusion, residual or recurrent aneurysm, or high flow vascular malformation. Dominant right and smaller left vertebral artery contribute to a normal basilar artery.     DURAL VENOUS SINUSES: Expected enhancement of the major dural venous sinuses.    NECK CTA:  RIGHT CAROTID: No measurable stenosis or dissection.    LEFT CAROTID: No measurable stenosis or dissection.    VERTEBRAL ARTERIES: No focal stenosis or dissection. Dominant right and smaller left vertebral arteries.    AORTIC ARCH: Classic aortic arch anatomy with no significant stenosis at the origin of the great vessels.    NONVASCULAR STRUCTURES: 7 mm nodule in the left thyroid. Mild degenerative changes of the cervical spine, without high-grade canal stenosis. Visualized  "portions of the lungs are clear.      Impression    IMPRESSION:   HEAD CT:  1.  Acute intraparenchymal hemorrhage in the left thalamus/internal capsule measuring 6 x 7 x 14 mm in diameter.  2.  Nondilated shunted ventricular configuration.  3.  Chronic changes, as described.    HEAD CTA:   1.  Irregular stenoses of the KIMBERLEE and MCA branches, which may reflect an atypical presentation of vasospasm given the presence of hemorrhage. Additional considerations would include RCVS, vasculitis, or less likely age-advanced atherosclerosis.    NECK CTA:  1.  No hemodynamically significant stenosis in the neck vessels.   2.  No evidence for dissection.    Findings were discussed with Dr. Dennis at 5:46 AM on 10/31/2024.                       Data     CBC RESULTS:   Recent Labs   Lab Test 10/31/24  0455   WBC 7.8   RBC 4.83   HGB 14.2   HCT 43.1   MCV 89   MCH 29.4   MCHC 32.9   RDW 12.9        Basic Metabolic Panel:  Lab Results   Component Value Date     10/31/2024     12/27/2011      Lab Results   Component Value Date    POTASSIUM 3.5 10/31/2024    POTASSIUM 3.7 12/27/2011     Lab Results   Component Value Date    CHLORIDE 101 10/31/2024    CHLORIDE 106 12/27/2011     Lab Results   Component Value Date    LAURIE 9.2 10/31/2024    LAURIE 8.9 12/27/2011     Lab Results   Component Value Date    CO2 25 10/31/2024    CO2 24 12/27/2011     Lab Results   Component Value Date    BUN 11.6 10/31/2024    BUN 10 12/27/2011     Lab Results   Component Value Date    CR 0.75 10/31/2024    CR 0.69 12/27/2011     Lab Results   Component Value Date     10/31/2024     10/31/2024    GLC 90 12/27/2011     INR:  No results found for: \"INR\"   "

## 2024-10-31 NOTE — PROGRESS NOTES
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 Reports needing to use her cane more than usual. On arrival BP of 187/108. No focal deficits noted on exam by ED provider. CT, CTA was obtained and Stroke team was called after CT showed Left thalmic IPH. Clinical swallow eval completed per MD orders to further assess oropharyngeal swallow function.   Type of Evaluation   Type of Evaluation Swallow Evaluation   Oral Motor   Oral Musculature generally intact   Structural Abnormalities none present   Mucosal Quality adequate   Dentition (Oral Motor)   Dentition (Oral Motor) adequate dentition   Facial Symmetry (Oral Motor)   Facial Symmetry (Oral Motor) WNL   Lip Function (Oral Motor)   Lip Range of Motion (Oral Motor) WNL   Tongue Function (Oral Motor)   Tongue ROM (Oral Motor) WNL   Jaw Function (Oral Motor)   Jaw Function (Oral Motor) WNL   Cough/Swallow/Gag Reflex (Oral Motor)   Soft Palate/Velum (Oral Motor) WNL   Volitional Throat Clear/Cough (Oral Motor) WNL   Vocal Quality/Secretion Management (Oral Motor)   Vocal Quality (Oral Motor) WFL   Secretion Management (Oral Motor) WNL   General Swallowing Observations   Past History of Dysphagia No hx of dysphagia per chart review or pt report   Respiratory Support room air   Current Diet/Method of Nutritional Intake (General Swallowing Observations, NIS) thin liquids  (level 0);regular diet   Swallowing Evaluation Clinical swallow evaluation   Clinical Swallow Evaluation   Feeding Assistance no assistance needed   Clinical Swallow Evaluation Textures Trialed thin liquids;pureed;solid foods   Clinical Swallow Eval: Thin Liquid Texture Trial   Mode of Presentation, Thin Liquids self-fed;cup   Volume of Liquid or Food Presented 8 oz   Oral Phase of Swallow WFL   Pharyngeal Phase of Swallow intact   Diagnostic Statement No overt s/sx of aspiration   Clinical Swallow Evaluation: Puree Solid Texture Trial   Mode of Presentation, Puree spoon;self-fed   Volume of Puree Presented 3 tbsp   Oral Phase, Puree WFL   Pharyngeal Phase, Puree intact   Diagnostic Statement No overt s/sx of aspiration   Clinical Swallow Evaluation: Solid Food Texture Trial   Mode of Presentation self-fed   Volume Presented 3 tbsp   Oral Phase WFL   Pharyngeal Phase intact   Diagnostic Statement No overt s/sx of aspiration   Esophageal Phase of Swallow   Patient reports or presents with symptoms of esophageal dysphagia No   Swallowing Recommendations   Diet Consistency Recommendations thin liquids (level 0);regular diet   Supervision Level for Intake patient independent   Mode of Delivery Recommendations bolus size, small;food moistened;slow rate of intake   Swallowing Maneuver Recommendations alternate food and liquid intake;extra swallow   Monitoring/Assistance Required (Eating/Swallowing) monitor for cough or change in vocal quality with intake;stop eating activities when fatigue is present   Recommended Feeding/Eating Techniques (Swallow Eval) maintain upright sitting position for eating;maintain upright posture during/after eating for 30 minutes;minimize distractions during oral intake;set-up and prepare tray   Medication Administration Recommendations, Swallowing (SLP) as tolerated   Instrumental Assessment Recommendations instrumental evaluation not recommended at this time   General Therapy Interventions    Planned Therapy Interventions Dysphagia Treatment   Dysphagia treatment Instruction of safe swallow strategies;Compensatory strategies for swallowing   Clinical Impression   Criteria for Skilled Therapeutic Interventions Met (SLP Eval) Current level of function same as previous level of function   SLP Diagnosis Oropharyngeal swallow WFL   Risks & Benefits of therapy have been explained evaluation/treatment results reviewed;care plan/treatment goals reviewed;risks/benefits reviewed;current/potential barriers reviewed;participants voiced agreement with care plan;participants included;patient   Clinical Impression Comments Clinical swallow eval completed per MD orders. Pt presents with functional oropharyngeal swallowing skills. Oral mech exam unremarkable. Pt tolerated thin liquids, pureed, and regular solid textures with no overt s/sx of aspiration. Functional time for mastication. Recommend regular diet and thin liquids. Pt should be fully upright and alert for all PO, take small sips/bites, slow pacing, and alternate between consistencies. Pt verbalized good understanding. No additional ST needs indicated. Will complete orders.   SLP Discharge Recommendation home with assist   SLP Rationale for DC Rec no additional ST needs indicated   SLP Brief overview of current status  Recommend regular diet and thin liquids. Pt should be fully upright and alert for all PO, take small sips/bites, slow pacing, and alternate between consistencies.   SLP Time and Intention   Total Session Time (sum of timed and untimed services) 14

## 2024-10-31 NOTE — PLAN OF CARE
Goal Outcome Evaluation:      Plan of Care Reviewed With: patient    Overall Patient Progress: improvingOverall Patient Progress: improving    Outcome Evaluation: patient weaned off nicardipine, SBP<140; MRI completed; q2h neuro checks, R pupil sluggish per baseline, new R numbness and tingling; SLP cleared for regular diet    Neuro: Alert, intermittently disoriented to time; SBA; R pupil sluggish (baseline from previous stroke), nystagmus; slight RUE weakness, GRANGER, follows commands; denies pain, numbness and tingling on RUE and occasionally RLE  Cardio: SR 60-90s; SBP<140, weaned off nicardipine gtt, afebrile  Respiratory: RA  GI/:regular diet, pt doesn't recall last BM; BG ACHS; voids spontaneously  Skin: WDL      Home meds sent to pharmacy; wallet and phone/ at bedside with walker and clothes    Handoff given to following RN. For VS and complete assessments, please see documentation flowsheets.

## 2024-10-31 NOTE — ED TRIAGE NOTES
Here for concern of right sided numbness/weakness started 3 days ago. Stated that her right leg/foot initially became numbed and now is also having right hand/arm numbness/weakness. Also stated lightheadedness 2 days ago and fell down yesterday around 10am due to her dizziness. Denies bumping her head. No LOC. Also c/o constipation, last bowel movement was 3 days ago. Stated stroke and heart attack in 2013. ABCs intact.      Triage Assessment (Adult)       Row Name 10/31/24 0435          Triage Assessment    Airway WDL WDL        Respiratory WDL    Respiratory WDL WDL        Cardiac WDL    Cardiac WDL WDL

## 2024-10-31 NOTE — ED PROVIDER NOTES
Trinh Dobson is a 44-year-old female with history of subarachnoid hemorrhage 2013 (ruptured basilar tip aneurysm s/p clipping), CVA, epilepsy (not currently on AEM),  shunt, not currently anticoagulated, seen here in our ED today by Dr. Dennis and diagnosed with a right sided hemorrhagic thalamic stroke.  Neurosurgery was consulted, patient will need higher level of care and blood pressure control, but there is no emergent intervention otherwise recommended at this time.  She was hypertensive on arrival and is currently on a nicardipine drip to obtain a systolic blood pressure of around 140.  She was stable on transfer of care to me awaiting admission to outside facility.  There is no bed available at [facility] and therefore she was admitted to [facility].  I spoke with Dr. Hill of the hospitalist service who accepted the patient.  Patient will be transferred via EMS once bed is available.  She currently is in stable condition with slowly downtrending blood pressure.        Adriane Duron MD  10/31/24 1724

## 2024-10-31 NOTE — H&P
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 She presented to [facility] this morning with ~ 5 days of right sided numbness and weakness. She has also had light headedness for couple of days that resulted in a fall yesterday. Presenting BP was 187/108; she was started on nicardipine to keep SBP < 140. She was found to a small left thalamic IPH.     She presented to [facility] on 10/29 with a 3 day history of right sided numbness and generalized weakness. She was found to have hypokalemia for which she was given oral supplementation. Brain imaging was not done at that time.     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  #History of seizures in the setting of SAH, not currently on AEDs  -requested shunt records from [facility]   -repeat CTH ~ 1100  -MRI brain w/ and w/out contrast   -Neurochecks every 2 hrs  -SBP goal < 140 mmHg  -HOB > 30   -PT/OT/SLP  -UDS  -shunt series XR     #Depression  -continue PTA citalopram 20 mg daily     #Analgesics & sedation  -tylenol prn     CV  #Cardiomyopathy   #Hypertension   -Cardiac monitoring  -SBP goal < 140 mmHg  -nicardipine   -PRN Labetalol and Hydralazine  -on amlodipine 5 mg and metoprolol succinate  mg daily at home; will  "increase amlodipine to 10 mg and switch from metoprolol to carvedilol 12.5 mg BID  -continue PTA atorvastatin 10 mg daily    Resp  -Continuous pulse ox  -Maintain O2 saturations greater than 92%    GI  #Constipation   -abdominal XR  Diet: consistent carb   Last BM: 10/28  GI prophylaxis: famotidine   -Bowel regimen: scheduled senna-docusate and Miralax  -bisacodyl PO PRN    Renal/  -Daily BMP  -IV fluids: not required   -Electrolyte replacement protocol    Endo  #DM2  -Hgb A1c  -Monitor glucose levels  -on jardiance 25 mg daily at home     Heme  -Daily CBC  -Hgb goal >7, plt goal >100k  -Transfuse to meet Hgb and plt goals    ID  #vulvovaginal candidiasis   -fluconazole 150 mg PO x 1  -Daily CBC  -Follow temperature curve    ICU Checklist  Lines/tubes/drains: PIV x 1  FEN: PO fluids, consistent carb diet   PPX: DVT - SCDs, chemoppx contraindicated in the setting of IPH; GI - PO famotidine.  Code: full   Dispo: ICU - NCC    Clinically Significant Risk Factors Present on Admission                   # Hypertension: Noted on problem list         # Obesity: Estimated body mass index is 31.78 kg/m  as calculated from the following:    Height as of this encounter: 1.499 m (4' 11.02\").    Weight as of this encounter: 71.4 kg (157 lb 6.5 oz).             TIME SPENT ON THIS ENCOUNTER   I spent 50 minutes of critical care time on the unit/floor managing the care of Trinh Dobson excluding time performing procedures. Upon evaluation, this patient had a high probability of imminent or life-threatening deterioration due to ICH, which required my direct attention, intervention, and personal management. Greater than 50% of my time was spent at the bedside counseling the patient and/or coordinating care including chart review, history, exam, documentation, and further activities per this note. I have personally reviewed the following data/imaging over the past 24 hours.     The patient was seen and discussed with the NCC attending, " Dr. Hill.    Keturah Leija NP       No Known Allergies    Current Medications:  Current Facility-Administered Medications   Medication Dose Route Frequency Provider Last Rate Last Admin    famotidine (PEPCID) injection 20 mg  20 mg Intravenous BID Keturah Leija NP        Or    famotidine (PEPCID) tablet 20 mg  20 mg Oral or NG Tube BID Keturah Leija NP        fluconazole (DIFLUCAN) tablet 150 mg  150 mg Oral Once Keturah Leija NP        insulin aspart (NovoLOG) injection (RAPID ACTING)  1-7 Units Subcutaneous TID AC Keturah Leija NP        insulin aspart (NovoLOG) injection (RAPID ACTING)  1-5 Units Subcutaneous At Bedtime Keturah Leija NP        polyethylene glycol (MIRALAX) Packet 17 g  17 g Oral Daily Keturah Leija NP           PRN Medications:  Current Facility-Administered Medications   Medication Dose Route Frequency Provider Last Rate Last Admin    acetaminophen (TYLENOL) tablet 650 mg  650 mg Oral Q4H PRN Keturah Leija NP        Or    acetaminophen (TYLENOL) oral liquid 650 mg  650 mg Per NG tube Q4H PRN Keturah Leija NP        glucose gel 15-30 g  15-30 g Oral Q15 Min PRN Keturah Leija NP        Or    dextrose 50 % injection 25-50 mL  25-50 mL Intravenous Q15 Min PRN Keturah Leija NP        Or    glucagon injection 1 mg  1 mg Subcutaneous Q15 Min PRN Keturah Leija NP        hydrALAZINE (APRESOLINE) injection 10 mg  10 mg Intravenous Q30 Min PRN Keturah Leija NP        labetalol (NORMODYNE/TRANDATE) injection 10 mg  10 mg Intravenous Q10 Min PRN Keturah Leija NP        ondansetron (ZOFRAN ODT) ODT tab 4 mg  4 mg Oral Q6H PRN Keturah Leija NP        Or    ondansetron (ZOFRAN) injection 4 mg  4 mg Intravenous Q6H PRN Keturah Leija NP        prochlorperazine (COMPAZINE) injection 10 mg  10 mg Intravenous Q6H PRN Keturah Leija NP        Or    prochlorperazine (COMPAZINE) tablet 10 mg  10 mg Oral Q6H PRN Keturah Leija NP        Or    prochlorperazine  "(COMPAZINE) suppository 25 mg  25 mg Rectal Q12H PRN Keturah Leija NP        senna-docusate (SENOKOT-S/PERICOLACE) 8.6-50 MG per tablet 1 tablet  1 tablet Oral BID PRN Keturah Leija NP        Or    senna-docusate (SENOKOT-S/PERICOLACE) 8.6-50 MG per tablet 2 tablet  2 tablet Oral BID PRN Keturah Leija NP           Infusions:  Current Facility-Administered Medications   Medication Dose Route Frequency Provider Last Rate Last Admin    niCARdipine 40 mg in 200 mL NS (CARDENE) infusion  0.5-15 mg/hr Intravenous Continuous Keturah Leija NP 75 mL/hr at 10/31/24 1027 15 mg/hr at 10/31/24 1027       Physical Examination:  Vitals: BP (!) 146/91   Pulse (!) 122   Temp 97.3  F (36.3  C)   Ht 1.499 m (4' 11.02\")   Wt 71.4 kg (157 lb 6.5 oz)   BMI 31.78 kg/m    General: Adult female patient, lying in bed, critically-ill  HEENT: Normocephalic, atraumatic, no icterus, oral cavity/oropharynx pink and moist  Cardiac: sinus tachycardia, s1/s2 auscultated without murmur  Pulm: CTAB, unlabored, expansion symmetric, no retractions or use of accessory muscles  Abdomen: Soft, non-distended   Extremities: Warm, no edema, distal pulses +2, well perfused  Skin: No rash or lesion  Psych: Calm and cooperative  Neuro:  Mental status: Awake, alert, attentive, oriented to self, time, place, and circumstance. Language is fluent and coherent with intact comprehension of complex commands, naming and repetition.  Cranial nerves: VFF, PERRL-right pupil sluggish, binocular diplopia, conjugate gaze, EOMI, facial sensation intact, subtle left lower facial weakness, shoulder shrug strong, tongue midline, no dysarthria.   Motor: Normal bulk and tone. No abnormal movements. 5/5 strength in 4/4 extremities.   Sensory: Intact to light touch x 4 extremities; subtly decreased in right forearm   Coordination: FNF and HS without ataxia or dysmetria.    Gait: NAOMIE, deferred.    NIHSS  Interval     1a. Level of Consciousness 0-->Alert, keenly " responsive   1b. LOC Questions 0-->Answers both questions correctly   1c. LOC Commands 0-->Performs both tasks correctly   2.   Best Gaze 0-->Normal   3.   Visual 0-->No visual loss (binocular diplopia)   4.   Facial Palsy 1-->Minor paralysis (flattened nasolabial fold, asymmetry on smiling)   5a. Motor Arm, Left 0-->No drift, limb holds 90 (or 45) degrees for full 10 secs   5b. Motor Arm, Right 0-->No drift, limb holds 90 (or 45) degrees for full 10 secs   6a. Motor Leg, Left 0-->No drift, leg holds 30 degree position for full 5 secs   6b. Motor Leg, right 0-->No drift, leg holds 30 degree position for full 5 secs   7.   Limb Ataxia 0-->Absent   8.   Sensory 1-->Mild-to-moderate sensory loss, patient feels pinprick is less sharp or is dull on the affected side, or there is a loss of superficial pain with pinprick, but patient is aware of being touched   9.   Best Language 0-->No aphasia, normal   10. Dysarthria 0-->Normal   11. Extinction and Inattention  0-->No abnormality   Total 2 (10/31/24 1126)     ICH Score (at arrival)  Scoring Tool Score   Age >= 80 years 1 point No   GCS score  3-4   5-12   13-15   2 point  1 point  0 point GCS 13-15   Hematoma volume, cm 3 >= 30 1 point No   Intraventricular extension 1 point No   Infratentorial location 1 point No   Total 0       Labs and Imaging:    Results for orders placed or performed during the hospital encounter of 10/31/24 (from the past 24 hours)   Glucose by meter   Result Value Ref Range    GLUCOSE BY METER POCT 180 (H) 70 - 99 mg/dL   EKG 12 lead   Result Value Ref Range    Systolic Blood Pressure  mmHg    Diastolic Blood Pressure  mmHg    Ventricular Rate 68 BPM    Atrial Rate 68 BPM    OK Interval 172 ms    QRS Duration 76 ms     ms    QTc 444 ms    P Axis 33 degrees    R AXIS -12 degrees    T Axis 15 degrees    Interpretation ECG       Sinus rhythm  Minimal voltage criteria for LVH, may be normal variant ( R in aVL )  Nonspecific T wave  abnormality  Abnormal ECG  When compared with ECG of 25-Mar-2015 22:49,  Nonspecific T wave abnormality now evident in Anterolateral leads  Unconfirmed report - interpretation of this ECG is computer generated - see medical record for final interpretation  Confirmed by - EMERGENCY ROOM, PHYSICIAN ([id number]),  JESSICA BARNES ([id number]) on 10/31/2024 7:14:56 AM     Extra Tube ([place] Draw)    Narrative    The following orders were created for panel order Extra Tube ([place] Draw).  Procedure                               Abnormality         Status                     ---------                               -----------         ------                     Extra Blue Top Tube[[id number]]                              Final result               Extra Red Top Tube[[id number]]                               Final result               Extra Green Top (Lithium...[[id number]]                      Final result               Extra Purple Top Tube[[id number]]                            Final result                 Please view results for these tests on the individual orders.   Extra Blue Top Tube   Result Value Ref Range    Hold Specimen JIC    Extra Red Top Tube   Result Value Ref Range    Hold Specimen JIC    Extra Green Top (Lithium Heparin) Tube   Result Value Ref Range    Hold Specimen JIC    Extra Purple Top Tube   Result Value Ref Range    Hold Specimen JIC    Basic metabolic panel   Result Value Ref Range    Sodium 137 135 - 145 mmol/L    Potassium 3.5 3.4 - 5.3 mmol/L    Chloride 101 98 - 107 mmol/L    Carbon Dioxide (CO2) 25 22 - 29 mmol/L    Anion Gap 11 7 - 15 mmol/L    Urea Nitrogen 11.6 6.0 - 20.0 mg/dL    Creatinine 0.75 0.51 - 0.95 mg/dL    GFR Estimate >90 >60 mL/min/1.73m2    Calcium 9.2 8.8 - 10.4 mg/dL    Glucose 156 (H) 70 - 99 mg/dL   CBC with platelets differential    Narrative    The following orders were created for panel order CBC with platelets differential.  Procedure                               Abnormality          Status                     ---------                               -----------         ------                     CBC with platelets and d...[[id number]]                      Final result                 Please view results for these tests on the individual orders.   Magnesium   Result Value Ref Range    Magnesium 2.2 1.7 - 2.3 mg/dL   CBC with platelets and differential   Result Value Ref Range    WBC Count 7.8 4.0 - 11.0 10e3/uL    RBC Count 4.83 3.80 - 5.20 10e6/uL    Hemoglobin 14.2 11.7 - 15.7 g/dL    Hematocrit 43.1 35.0 - 47.0 %    MCV 89 78 - 100 fL    MCH 29.4 26.5 - 33.0 pg    MCHC 32.9 31.5 - 36.5 g/dL    RDW 12.9 10.0 - 15.0 %    Platelet Count 377 150 - 450 10e3/uL    % Neutrophils 49 %    % Lymphocytes 40 %    % Monocytes 8 %    % Eosinophils 1 %    % Basophils 1 %    % Immature Granulocytes 0 %    NRBCs per 100 WBC 0 <1 /100    Absolute Neutrophils 3.9 1.6 - 8.3 10e3/uL    Absolute Lymphocytes 3.2 0.8 - 5.3 10e3/uL    Absolute Monocytes 0.6 0.0 - 1.3 10e3/uL    Absolute Eosinophils 0.1 0.0 - 0.7 10e3/uL    Absolute Basophils 0.1 0.0 - 0.2 10e3/uL    Absolute Immature Granulocytes 0.0 <=0.4 10e3/uL    Absolute NRBCs 0.0 10e3/uL   Head CT w/o contrast    Narrative    EXAM: CTA HEAD NECK W CONTRAST, CT HEAD W/O CONTRAST  LOCATION: [facility]  DATE: 10/31/2024    INDICATION: 4 days RUE and RLE paresthesias, h o SAH, h o  shunt  COMPARISON: 3/25/2015  CONTRAST: 67mL Isovue 370  TECHNIQUE: Head and neck CT angiogram with IV contrast. Noncontrast head CT followed by axial helical CT images of the head and neck vessels obtained during the arterial phase of intravenous contrast administration. Axial 2D reconstructed images and   multiplanar 3D MIP reconstructed images of the head and neck vessels were performed by the technologist. Dose reduction techniques were used. All stenosis measurements made according to NASCET criteria unless otherwise specified.    FINDINGS:    NONCONTRAST HEAD CT:   INTRACRANIAL CONTENTS: Coil mass in the region of the basilar artery limits evaluation of adjacent structures. Small acute intraparenchymal hemorrhage in the left thalamus/internal capsule measuring approximately 6 x 7 x 14 mm in diameter. No midline   shift or effacement of the basal cisterns. Chronic encephalomalacia and gliosis in the right occipital lobe and left cerebellum. Right frontal ventriculostomy catheter terminates near the right foramen of Monro. The ventricles are slitlike in   configuration.     VISUALIZED ORBITS/SINUSES/MASTOIDS: No intraorbital abnormality. No paranasal sinus mucosal disease. No middle ear or mastoid effusion.    BONES/SOFT TISSUES: No acute abnormality.    HEAD CTA:  ANTERIOR CIRCULATION: No proximal occlusion, aneurysm, or high flow vascular malformation. High-grade stenoses in the KIMBERLEE branches with milder stenoses in the MCA branches. Standard [place] anatomy.    POSTERIOR CIRCULATION: No definite proximal occlusion, residual or recurrent aneurysm, or high flow vascular malformation. Dominant right and smaller left vertebral artery contribute to a normal basilar artery.     DURAL VENOUS SINUSES: Expected enhancement of the major dural venous sinuses.    NECK CTA:  RIGHT CAROTID: No measurable stenosis or dissection.    LEFT CAROTID: No measurable stenosis or dissection.    VERTEBRAL ARTERIES: No focal stenosis or dissection. Dominant right and smaller left vertebral arteries.    AORTIC ARCH: Classic aortic arch anatomy with no significant stenosis at the origin of the great vessels.    NONVASCULAR STRUCTURES: 7 mm nodule in the left thyroid. Mild degenerative changes of the cervical spine, without high-grade canal stenosis. Visualized portions of the lungs are clear.      Impression    IMPRESSION:   HEAD CT:  1.  Acute intraparenchymal hemorrhage in the left thalamus/internal capsule measuring 6 x 7 x 14 mm in diameter.  2.  Nondilated shunted  ventricular configuration.  3.  Chronic changes, as described.    HEAD CTA:   1.  Irregular stenoses of the KIMBERLEE and MCA branches, which may reflect an atypical presentation of vasospasm given the presence of hemorrhage. Additional considerations would include RCVS, vasculitis, or less likely age-advanced atherosclerosis.    NECK CTA:  1.  No hemodynamically significant stenosis in the neck vessels.   2.  No evidence for dissection.    Findings were discussed with Dr. Dennis at 5:46 AM on 10/31/2024.                CTA Head Neck with Contrast    Narrative    EXAM: CTA HEAD NECK W CONTRAST, CT HEAD W/O CONTRAST  LOCATION: [facility]  DATE: 10/31/2024    INDICATION: 4 days RUE and RLE paresthesias, h o SAH, h o  shunt  COMPARISON: 3/25/2015  CONTRAST: 67mL Isovue 370  TECHNIQUE: Head and neck CT angiogram with IV contrast. Noncontrast head CT followed by axial helical CT images of the head and neck vessels obtained during the arterial phase of intravenous contrast administration. Axial 2D reconstructed images and   multiplanar 3D MIP reconstructed images of the head and neck vessels were performed by the technologist. Dose reduction techniques were used. All stenosis measurements made according to NASCET criteria unless otherwise specified.    FINDINGS:   NONCONTRAST HEAD CT:   INTRACRANIAL CONTENTS: Coil mass in the region of the basilar artery limits evaluation of adjacent structures. Small acute intraparenchymal hemorrhage in the left thalamus/internal capsule measuring approximately 6 x 7 x 14 mm in diameter. No midline   shift or effacement of the basal cisterns. Chronic encephalomalacia and gliosis in the right occipital lobe and left cerebellum. Right frontal ventriculostomy catheter terminates near the right foramen of Monro. The ventricles are slitlike in   configuration.     VISUALIZED ORBITS/SINUSES/MASTOIDS: No intraorbital abnormality. No paranasal sinus mucosal disease. No middle  ear or mastoid effusion.    BONES/SOFT TISSUES: No acute abnormality.    HEAD CTA:  ANTERIOR CIRCULATION: No proximal occlusion, aneurysm, or high flow vascular malformation. High-grade stenoses in the KIMBERLEE branches with milder stenoses in the MCA branches. Standard [place] anatomy.    POSTERIOR CIRCULATION: No definite proximal occlusion, residual or recurrent aneurysm, or high flow vascular malformation. Dominant right and smaller left vertebral artery contribute to a normal basilar artery.     DURAL VENOUS SINUSES: Expected enhancement of the major dural venous sinuses.    NECK CTA:  RIGHT CAROTID: No measurable stenosis or dissection.    LEFT CAROTID: No measurable stenosis or dissection.    VERTEBRAL ARTERIES: No focal stenosis or dissection. Dominant right and smaller left vertebral arteries.    AORTIC ARCH: Classic aortic arch anatomy with no significant stenosis at the origin of the great vessels.    NONVASCULAR STRUCTURES: 7 mm nodule in the left thyroid. Mild degenerative changes of the cervical spine, without high-grade canal stenosis. Visualized portions of the lungs are clear.      Impression    IMPRESSION:   HEAD CT:  1.  Acute intraparenchymal hemorrhage in the left thalamus/internal capsule measuring 6 x 7 x 14 mm in diameter.  2.  Nondilated shunted ventricular configuration.  3.  Chronic changes, as described.    HEAD CTA:   1.  Irregular stenoses of the KIMBERLEE and MCA branches, which may reflect an atypical presentation of vasospasm given the presence of hemorrhage. Additional considerations would include RCVS, vasculitis, or less likely age-advanced atherosclerosis.    NECK CTA:  1.  No hemodynamically significant stenosis in the neck vessels.   2.  No evidence for dissection.    Findings were discussed with Dr. Dennis at 5:46 AM on 10/31/2024.                EKG 12 lead   Result Value Ref Range    Systolic Blood Pressure  mmHg    Diastolic Blood Pressure  mmHg    Ventricular Rate 118 BPM     Atrial Rate 118 BPM    NM Interval 160 ms    QRS Duration 68 ms     ms    QTc 465 ms    P Axis 53 degrees    R AXIS 16 degrees    T Axis 15 degrees    Interpretation ECG       Sinus tachycardia  Otherwise normal ECG  When compared with ECG of 31-Oct-2024 04:50,  Vent. rate has increased by  50 bpm  Unconfirmed report - interpretation of this ECG is computer generated - see medical record for final interpretation  Confirmed by - EMERGENCY ROOM, PHYSICIAN ([id number]),  Kadeem Martines ([id number]) on 10/31/2024 10:11:40 AM         All relevant imaging and laboratory values personally reviewed.

## 2024-10-31 NOTE — PROGRESS NOTES
[facility]    Stroke Telephone Note    I was called by Dexter Dennis on 10/31/24 regarding patient Trinh Dobson. The patient is a 44 year old woman with h/o HTN, Basilar aneurysm rupture s/p clipping (2013, care was in [place], has residual weakness and uses cane to ambulate and has 3rd nerve pasy),  shunt, seizures in the setting of BA rupture (currently not on antiepileptics), who presents today with 3-5 days of right sided paresthesia. Reports needing to use her cane more than usual. On arrival BP of 187/108. No focal deficits noted on exam by ED provider. CT, CTA was obtained and Stroke team was called after CT showed Left thalmic IPH.  Med review shows clomiphene and labetalol as her only med intake. Will need more history from patient.    Vitals  BP: (!) 160/131   Pulse: 117   Resp: 18   Temp: 97.6  F (36.4  C)        Imaging Findings  CT head: Left thalamic IPH  CTA head/neck: concern for b/l KIMBERLEE stenoses (there is streak artefact from the  shunt but portions above the artefact seem also stenosed), Radiology read also denotes possible b/l MCA stenoses.     Impression  - Left thalamic IPH  Most likely secondary to HTN given location and h/o HTN. Can consider drug screen as well. Dural venous sinuses appear patent on CTA; deep cerebral vein thrombosis related thalamic infarct/hemorrhage was considered only due to younger age, gender and being on clomiphene; MRI Brain with MRV can be considered to better discern the same.    - Radiology read raised concerns for possible RCVS; bleeds secondary to the same should be more cortically located. Day team can discuss with patient re: any precipitating factors    Recommendations  Acute Hemorrhagic Stroke Recommendations  - Neurochecks and Vital Signs every 4 hours  - Systolic BP Goal: < 140  - Head of bed elevated  - Telemetry, EKG  - Imaging: Repeat Head CT in 6 hours to assess for stability  - Consider MRI Brain w/wo contrast +/- MRV,  "drug screen  - Bedside Glucose Monitoring  - A1c, Troponin x 3  - PT/OT/SLP  - Stroke Education  - Euthermia, Euglycemia    My recommendations are based on the information provided over the phone by Trinh Dobson's in-person providers. They are not intended to replace the clinical judgment of her in-person providers. I was not requested to personally see or examine the patient at this time.     Jorge James MD  Vascular Neurology    To page me or covering stroke neurology team member, click here: [email or web address]  Choose \"On Call\" tab at top, then select \"NEUROLOGY/ALL SITES\" from middle drop-down box, press Enter, then look for \"stroke\" or \"telestroke\" for your site.   "

## 2024-10-31 NOTE — LETTER
Transition Communication Hand-off for Care Transitions to Next Level of Care Provider    Name: Trinh Dobson  : 1980  MRN #: [id number]  Primary Care Provider: Lorraine Page     Primary Clinic: [address] [place] [address]     Reason for Hospitalization:  Hemmorhagic stroke, thalamic stroke  Intraparenchymal hemorrhage of brain (H)  Hypertension goal BP (blood pressure) < 140/90  Admit Date/Time: 10/31/2024  9:55 AM  Discharge Date: 24  Payor Source: Payor: MEDICARE / Plan: MEDICARE / Product Type: Medicare /     Reason for Communication Hand-off Referral: Continuous of care    Discharge Plan:  Discharge Plan:      Flowsheet Row Most Recent Value   Disposition Comments pt will discharge to home with Home care service and PCA           Discharge Needs Assessment:  Needs      Flowsheet Row Most Recent Value   Equipment Currently Used at Home walker, rolling              Referrals       Future Labs/Procedures    Home Care Referral     Comments:    [facility] [phone]  Fax #[phone]    RN to assess vital signs and weight, respiratory and cardiac status, pain level and activity tolerance, hydration, nutrition and bowel status and home safety.  RN to teach medication management.    PT eval and treat.        Your provider has ordered home health services. If you have not been contacted within 2 days of your discharge please call the selected Home Care agency listed on your Discharge document.  If a Home Care agency is NOT listed, please call [phone].              Roshni White RN, PHN, BSN  4A and 4E/ ICU  Care Coordinator  Phone: [phone]  Pager: [phone]          AVS/Discharge Summary is the source of truth; this is a helpful guide for improved communication of patient story

## 2024-10-31 NOTE — PHARMACY-ADMISSION MEDICATION HISTORY
"Pharmacist Admission Medication History    Admission medication history is complete. The information provided in this note is only as accurate as the sources available at the time of the update.    Information Source(s): Patient, Hospital records, and CareEverywhere/SureScripts via in-person    Pertinent Information:   - Pt manages own medications and appears to be an somewhat accurate/reliable historian.   - Pt had medications that she was to use today locked in the lockbox within the room. The patient's RN unlocked this box and it was revealed that she uses a pillpack pharmacy that packages daily medications.   - Pt reported taking all medications daily at ~1400, medications had not been taken yet today 10/31/24.  - Due to lack of Surescripts data, [facility] discharge medications list was cross-checked which listed all medications and atorvastatin 10 mg daily. Atorvastatin was not present in the physical pill-pack. When pt was asked, they weren't sure if they were taking it or not. Left on list, but marked as \"not taking\" for clinical decision making. No objective allergies or intolerances to statins found per chart review.  - Confirmed with patient that they are taking citalopram rather than escitalopram despite the discharge medications listed on the 10/28 [facility] report. Patient's physical medications did not include escitalopram and only had citalopram in the physical medication pouch.    Changes made to PTA medication list:  Added:   All medications listed on outpatient list below were added per [employer].  Deleted:   Clomiphene 50 mg tab  Labetalol 100 mg tab    Allergies reviewed with patient and updates made in EHR: yes    Medication History Completed By: Joe Louis [facility] 10/31/2024 11:58 AM    PTA Med List   Medication Sig Last Dose/Taking    amLODIPine (NORVASC) 5 MG tablet Take 5 mg by mouth daily. 10/30/2024 at  2:00 PM    citalopram (CELEXA) 20 MG tablet Take 20 mg by mouth daily. 10/30/2024 at  " 2:00 PM    empagliflozin (JARDIANCE) 10 MG TABS tablet Take 10 mg by mouth daily. 10/30/2024 at  2:00 PM    metoprolol succinate ER (TOPROL XL) 100 MG 24 hr tablet Take 100 mg by mouth daily. 10/30/2024 at  2:00 PM     Joe Louis PharmAbilioD., R.Ph., PGY2 Critical Care Resident Pharmacist

## 2024-11-01 ENCOUNTER — APPOINTMENT (OUTPATIENT)
Dept: PHYSICAL THERAPY | Facility: CLINIC | Age: 44
DRG: 064 | End: 2024-11-01
Attending: NURSE PRACTITIONER
Payer: MEDICARE

## 2024-11-01 VITALS
HEIGHT: 59 IN | WEIGHT: 161.6 LBS | DIASTOLIC BLOOD PRESSURE: 77 MMHG | HEART RATE: 65 BPM | OXYGEN SATURATION: 100 % | RESPIRATION RATE: 18 BRPM | TEMPERATURE: 98.7 F | BODY MASS INDEX: 32.58 KG/M2 | SYSTOLIC BLOOD PRESSURE: 117 MMHG

## 2024-11-01 LAB
ANION GAP SERPL CALCULATED.3IONS-SCNC: 11 MMOL/L (ref 7–15)
BUN SERPL-MCNC: 8.6 MG/DL (ref 6–20)
CALCIUM SERPL-MCNC: 8.9 MG/DL (ref 8.8–10.4)
CHLORIDE SERPL-SCNC: 106 MMOL/L (ref 98–107)
CREAT SERPL-MCNC: 0.75 MG/DL (ref 0.51–0.95)
EGFRCR SERPLBLD CKD-EPI 2021: >90 ML/MIN/1.73M2
ERYTHROCYTE [DISTWIDTH] IN BLOOD BY AUTOMATED COUNT: 13.3 % (ref 10–15)
GLUCOSE BLDC GLUCOMTR-MCNC: 160 MG/DL (ref 70–99)
GLUCOSE BLDC GLUCOMTR-MCNC: 172 MG/DL (ref 70–99)
GLUCOSE SERPL-MCNC: 155 MG/DL (ref 70–99)
HCO3 SERPL-SCNC: 23 MMOL/L (ref 22–29)
HCT VFR BLD AUTO: 44.3 % (ref 35–47)
HGB BLD-MCNC: 14.3 G/DL (ref 11.7–15.7)
LDLC SERPL DIRECT ASSAY-MCNC: 149 MG/DL
MAGNESIUM SERPL-MCNC: 2.1 MG/DL (ref 1.7–2.3)
MCH RBC QN AUTO: 29.1 PG (ref 26.5–33)
MCHC RBC AUTO-ENTMCNC: 32.3 G/DL (ref 31.5–36.5)
MCV RBC AUTO: 90 FL (ref 78–100)
PHOSPHATE SERPL-MCNC: 2.5 MG/DL (ref 2.5–4.5)
PLATELET # BLD AUTO: 353 10E3/UL (ref 150–450)
POTASSIUM SERPL-SCNC: 3.9 MMOL/L (ref 3.4–5.3)
RBC # BLD AUTO: 4.91 10E6/UL (ref 3.8–5.2)
SODIUM SERPL-SCNC: 140 MMOL/L (ref 135–145)
TSH SERPL DL<=0.005 MIU/L-ACNC: 0.38 UIU/ML (ref 0.3–4.2)
WBC # BLD AUTO: 8 10E3/UL (ref 4–11)

## 2024-11-01 PROCEDURE — 97530 THERAPEUTIC ACTIVITIES: CPT | Mod: GP | Performed by: PHYSICAL THERAPIST

## 2024-11-01 PROCEDURE — 97116 GAIT TRAINING THERAPY: CPT | Mod: GP | Performed by: PHYSICAL THERAPIST

## 2024-11-01 PROCEDURE — 999N000111 HC STATISTIC OT IP EVAL DEFER: Performed by: OCCUPATIONAL THERAPIST

## 2024-11-01 PROCEDURE — 97161 PT EVAL LOW COMPLEX 20 MIN: CPT | Mod: GP | Performed by: PHYSICAL THERAPIST

## 2024-11-01 PROCEDURE — 36415 COLL VENOUS BLD VENIPUNCTURE: CPT | Performed by: NURSE PRACTITIONER

## 2024-11-01 PROCEDURE — 250N000013 HC RX MED GY IP 250 OP 250 PS 637: Performed by: PSYCHIATRY & NEUROLOGY

## 2024-11-01 PROCEDURE — 80048 BASIC METABOLIC PNL TOTAL CA: CPT | Performed by: NURSE PRACTITIONER

## 2024-11-01 PROCEDURE — 84443 ASSAY THYROID STIM HORMONE: CPT | Performed by: NURSE PRACTITIONER

## 2024-11-01 PROCEDURE — 84100 ASSAY OF PHOSPHORUS: CPT | Performed by: NURSE PRACTITIONER

## 2024-11-01 PROCEDURE — 83735 ASSAY OF MAGNESIUM: CPT | Performed by: NURSE PRACTITIONER

## 2024-11-01 PROCEDURE — 83721 ASSAY OF BLOOD LIPOPROTEIN: CPT | Performed by: NURSE PRACTITIONER

## 2024-11-01 PROCEDURE — 85018 HEMOGLOBIN: CPT | Performed by: NURSE PRACTITIONER

## 2024-11-01 PROCEDURE — 250N000013 HC RX MED GY IP 250 OP 250 PS 637: Performed by: NURSE PRACTITIONER

## 2024-11-01 RX ORDER — ATORVASTATIN CALCIUM 20 MG/1
20 TABLET, FILM COATED ORAL EVERY EVENING
Status: DISCONTINUED | OUTPATIENT
Start: 2024-11-01 | End: 2024-11-01

## 2024-11-01 RX ORDER — NICOTINE POLACRILEX 4 MG
15-30 LOZENGE BUCCAL
Status: DISCONTINUED | OUTPATIENT
Start: 2024-11-01 | End: 2024-11-01 | Stop reason: HOSPADM

## 2024-11-01 RX ORDER — AMLODIPINE BESYLATE 10 MG/1
10 TABLET ORAL DAILY
Qty: 30 TABLET | Refills: 0 | Status: SHIPPED | OUTPATIENT
Start: 2024-11-02

## 2024-11-01 RX ORDER — CARVEDILOL 12.5 MG/1
12.5 TABLET ORAL 2 TIMES DAILY WITH MEALS
Qty: 30 TABLET | Refills: 0 | Status: SHIPPED | OUTPATIENT
Start: 2024-11-01

## 2024-11-01 RX ORDER — ATORVASTATIN CALCIUM 10 MG/1
10 TABLET, FILM COATED ORAL EVERY EVENING
Status: DISCONTINUED | OUTPATIENT
Start: 2024-11-01 | End: 2024-11-01 | Stop reason: HOSPADM

## 2024-11-01 RX ORDER — DEXTROSE MONOHYDRATE 25 G/50ML
25-50 INJECTION, SOLUTION INTRAVENOUS
Status: DISCONTINUED | OUTPATIENT
Start: 2024-11-01 | End: 2024-11-01 | Stop reason: HOSPADM

## 2024-11-01 RX ORDER — ATORVASTATIN CALCIUM 10 MG/1
10 TABLET, FILM COATED ORAL EVERY EVENING
Qty: 30 TABLET | Refills: 0 | Status: SHIPPED | OUTPATIENT
Start: 2024-11-01

## 2024-11-01 RX ADMIN — POTASSIUM & SODIUM PHOSPHATES POWDER PACK 280-160-250 MG 1 PACKET: 280-160-250 PACK at 11:51

## 2024-11-01 RX ADMIN — CARVEDILOL 12.5 MG: 12.5 TABLET, FILM COATED ORAL at 07:50

## 2024-11-01 RX ADMIN — POLYETHYLENE GLYCOL 3350 17 G: 17 POWDER, FOR SOLUTION ORAL at 07:50

## 2024-11-01 RX ADMIN — SENNOSIDES AND DOCUSATE SODIUM 1 TABLET: 8.6; 5 TABLET ORAL at 07:50

## 2024-11-01 RX ADMIN — POTASSIUM & SODIUM PHOSPHATES POWDER PACK 280-160-250 MG 1 PACKET: 280-160-250 PACK at 06:34

## 2024-11-01 RX ADMIN — INSULIN ASPART 1 UNITS: 100 INJECTION, SOLUTION INTRAVENOUS; SUBCUTANEOUS at 07:50

## 2024-11-01 RX ADMIN — FAMOTIDINE 20 MG: 20 TABLET ORAL at 07:50

## 2024-11-01 RX ADMIN — CITALOPRAM HYDROBROMIDE 20 MG: 10 TABLET ORAL at 07:50

## 2024-11-01 RX ADMIN — AMLODIPINE BESYLATE 10 MG: 10 TABLET ORAL at 07:50

## 2024-11-01 ASSESSMENT — ACTIVITIES OF DAILY LIVING (ADL)
ADLS_ACUITY_SCORE: 0

## 2024-11-01 NOTE — PROGRESS NOTES
Physical Therapy Initial Evaluation   11/01/24 0930   Appointment Info   Signing Clinician's Name / Credentials (PT) Oskar Doll, SONYA   Living Environment   People in Home alone   Current Living Arrangements apartment   Home Accessibility no concerns;wheelchair accessible   Transportation Anticipated health plan transportation;family or friend will provide   Living Environment Comments Has PCA 3x/day she reports, helps with things around the house.   Self-Care   Usual Activity Tolerance good   Current Activity Tolerance good   Regular Exercise No   Equipment Currently Used at Home walker, rolling   Fall history within last six months yes   Number of times patient has fallen within last six months 1  (reports legs maybe gave out)   Activity/Exercise/Self-Care Comment Ind PLOF, normally no AD but occasionally uses 4WW. Reports being fairly sedentary.   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 She presented to [facility] this morning with ~ 5 days of right sided numbness and weakness. She has also had light headedness for couple of days that resulted in a fall yesterday. Presenting BP was 187/108; she was started on nicardipine to keep SBP < 140. She was found to a small left thalamic IPH.      She presented to [facility] on 10/29 with a 3 day history of right sided numbness and generalized  weakness. She was found to have hypokalemia for which she was given oral supplementation. Brain imaging was not done at that time.   Existing Precautions/Restrictions fall   General Observations RA, VSS   Cognition   Affect/Mental Status (Cognition) WFL   Orientation Status (Cognition) oriented to;person;place;time   Cognitive Status Comments increased time spent trying to recal date but able to do so.   Strength (Manual Muscle Testing)   Strength Comments UE/LE strength grossly symmetric and around 4+/5   Bed Mobility   Comment, (Bed Mobility) SBA   Transfers   Comment, (Transfers) SBA   Gait/Stairs (Locomotion)   Comment, (Gait/Stairs) Ambulates no AD 50ft, CGA, slightly slow speed but no LOB, no increase in symptoms. Feels a little off balance and fatigued.   Balance   Balance Comments EO/EC and static head turns no AD, CGA no LOB   Coordination   Coordination Comments Finger tip to nose in standing symmetric   Clinical Impression   Criteria for Skilled Therapeutic Intervention Yes, treatment indicated   PT Diagnosis (PT) impaired functional mobility   Influenced by the following impairments weakness, deconditioning   Functional limitations due to impairments ambulation   Clinical Presentation (PT Evaluation Complexity) stable   Clinical Presentation Rationale clinical judgment   Clinical Decision Making (Complexity) low complexity   Planned Therapy Interventions (PT) balance training;gait training;transfer training;neuromuscular re-education   Risk & Benefits of therapy have been explained evaluation/treatment results reviewed;care plan/treatment goals reviewed;risks/benefits reviewed;current/potential barriers reviewed;participants voiced agreement with care plan;participants included;patient   PT Total Evaluation Time   PT Eval, Low Complexity Minutes (30706) 5   Physical Therapy Goals   PT Frequency 6x/week   PT Predicted Duration/Target Date for Goal Attainment 11/08/24   PT Goals Transfers;Gait;PT Goal 1    PT: Transfers Independent;Bed to/from chair   PT: Gait Modified independent;Greater than 200 feet  (LRAD)   PT: Goal 1 Patient to be independent with HEP in order to improve strength and endurance.   PT Discharge Planning   PT Plan ensure ind gait, HEP   PT Discharge Recommendation (DC Rec) home with assist;home with home care physical therapy   PT Rationale for DC Rec Patient mobilizing fairly well today. Able to ambulate and transfer without assist, no increase in symptoms. Has PCA services, would benefit from home health PT for home safety assessment and continued balance/endurance training.   PT Brief overview of current status 1A 4WW

## 2024-11-01 NOTE — PROGRESS NOTES
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 She presented to [facility] this morning with ~ 5 days of right sided numbness and weakness. She has also had light headedness for couple of days that resulted in a fall yesterday. Presenting BP was 187/108; she was started on nicardipine to keep SBP < 140. She was found to a small left thalamic IPH.      She presented to [facility] on 10/29 with a 3 day history of right sided numbness and generalized weakness. She was found to have hypokalemia for which she was given oral supplementation. Brain imaging was not done at that time.     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  #History of seizures in the setting of SAH, not currently on AEDs  -Awaiting shunt records from OSH   -10/31 MRI brain: with bleed stability, no underlying mass lesions or  other concerning abnormality  -10/31 XR shunt series:  shunt in usual position without evidence of disruption   -Neurochecks every 2 hrs  -SBP goal < 140 mmHg  -HOB > 30   -PT/OT as indicated       #Analgesics & sedation  -Tylenol 650 mg Q4h PRN    PSYCH   #Depression  -Continue PTA Citalopram 20 mg daily       CV  #Cardiomyopathy   #Hypertension  #Hyperlipidemia    -Cardiac monitoring  -SBP goal < 140 mmHg  -Stop Nicardipine infusion  -Continue Amlodipine 10 mg daily  -Continue Carvedilol 12.5 mg BID   -PRN Labetalol and Hydralazine  -Start Atorvastatin 10 mg daily  -11/1 LDL direct: 149     Resp  Oxygen: Room air   -Continuous pulse ox  -Maintain O2 saturations greater than 92%     GI  Diet: Consistent carb diet w/thin liquids   Last BM: 11/1  GI prophylaxis: Stop Famotidine - no indication   #Constipation   -Bowel regimen: Scheduled Senna-docusate BID and Miralax daily      Renal/  -Daily BMP  -IV fluids: None   -Electrolyte replacement protocol     Endo  #Obesity  #DM2  #Hyperglycemia   -10/31 Hgb A1c: 7.8  -Change to high insulin sliding scale  -Add on TSH    -Hold PTA Jardiance 25 mg daily   -Monitor glucose levels     Heme  -Daily CBC  -Hgb goal >7, plt goal >100k  -Transfuse to meet Hgb and plt goals     ID  #Vulvovaginal candidiasis   -Fluconazole 150 mg PO x1  -Daily CBC  -Follow temperature curve     ICU Checklist  Lines/tubes/drains: PIV x2  FEN: Consistent carb diet, replacement protocol    PPX: DVT - SCDs; GI - stop Famotidine.  Code: Full code   Dispo: Possibly home today      I spent 35 minutes of critical care time on the unit/floor managing the care of Trinh Dobson excluding time performing procedures. Upon evaluation, this patient had a high probability of imminent or life-threatening deterioration due to IPH, which required my direct attention, intervention, and personal management. Greater than 50% of my time was spent at the bedside counseling the patient and/or coordinating care including chart review, history, exam, documentation, and further activities per this note. I have personally reviewed the following data/imaging over the past 24 hours.     The patient was seen and discussed with the NCC attending, Dr. Hill.    Andria MOORE, CNP  Neurocritical care nurse practitioner  Ascom:  *[phone] available [facility] 0700 to 1900     24 Hour Vital Signs Summary:  Temp: 98.2  F (36.8  C) Temp  Min: 97.3  F (36.3  C)  Max: 98.2  F (36.8  C)  Resp: 18 Resp  Min: 14  Max: 20  SpO2: 100 % SpO2  Min: 96 %  Max: 100 %  Pulse: 62 Pulse  Min: 61  Max: 144  BP: 138/78 Systolic (24hrs), Av , Min:69 , Max:188   Diastolic (24hrs), Av, Min:52, Max:171    Respiratory monitoring:   Resp: 18  Room air     I/O last 3 completed shifts:  In: 1036.26 [P.O.:720; I.V.:316.26]  Out: 1025 [Urine:1025]    Current Medications:  Current Facility-Administered Medications   Medication Dose Route Frequency Provider Last Rate Last Admin    amLODIPine (NORVASC) tablet 10 mg  10 mg Oral Daily Keturah Leija NP   10 mg at 24 0750    atorvastatin (LIPITOR) tablet 10 mg  10 mg Oral QPM Keturah Leija NP   10 mg at 10/31/24 1953    carvedilol (COREG) tablet 12.5 mg  12.5 mg Oral BID w/meals Keturah Leija NP   12.5 mg at 24 0750    citalopram (celeXA) tablet 20 mg  20 mg Oral Daily Keturah Leija NP   20 mg at 24 0750    famotidine (PEPCID) injection 20 mg  20 mg Intravenous BID Keturah Leija NP        Or    famotidine (PEPCID) tablet 20 mg  20 mg Oral or NG Tube BID Keturah Leija NP   20 mg at 24 0750    insulin aspart (NovoLOG) injection (RAPID ACTING)  1-7 Units Subcutaneous TID AC Keturah Leija NP   2 Units at 10/31/24 1442    insulin aspart (NovoLOG) injection (RAPID ACTING)  1-5 Units Subcutaneous At Bedtime Keturah Leija NP        polyethylene glycol (MIRALAX) Packet 17 g  17 g Oral Daily Keturah Leija NP   17 g at 24 0750    potassium & sodium phosphates (NEUTRA-PHOS) Packet 1 packet  1 packet Oral or Feeding Tube Q4H Ko Hill MD   1 packet at 24 0634    senna-docusate (SENOKOT-S/PERICOLACE) 8.6-50 MG per tablet 1 tablet  1 tablet Oral BID Keturah Leija, NP   1 tablet at 24 0750       PRN Medications:  Current Facility-Administered Medications  "  Medication Dose Route Frequency Provider Last Rate Last Admin    acetaminophen (TYLENOL) tablet 650 mg  650 mg Oral Q4H PRN Keturah Leija NP        Or    acetaminophen (TYLENOL) oral liquid 650 mg  650 mg Per NG tube Q4H PRN Keturah Leija NP        bisacodyl (DULCOLAX) EC tablet 5 mg  5 mg Oral Daily PRN Keturah Leija NP        glucose gel 15-30 g  15-30 g Oral Q15 Min PRN Keturah Leija NP        Or    dextrose 50 % injection 25-50 mL  25-50 mL Intravenous Q15 Min PRN Keturah Leija NP        Or    glucagon injection 1 mg  1 mg Subcutaneous Q15 Min PRN Keturah Leija NP        hydrALAZINE (APRESOLINE) injection 10 mg  10 mg Intravenous Q30 Min PRN Keturah Leija NP        labetalol (NORMODYNE/TRANDATE) injection 10 mg  10 mg Intravenous Q10 Min PRN Keturah Leija NP        ondansetron (ZOFRAN ODT) ODT tab 4 mg  4 mg Oral Q6H PRN Keturah Leija NP        Or    ondansetron (ZOFRAN) injection 4 mg  4 mg Intravenous Q6H PRN Keturah Leija NP        prochlorperazine (COMPAZINE) injection 10 mg  10 mg Intravenous Q6H PRN Keturah Leija NP        Or    prochlorperazine (COMPAZINE) tablet 10 mg  10 mg Oral Q6H PRN Keturah Leija NP        Or    prochlorperazine (COMPAZINE) suppository 25 mg  25 mg Rectal Q12H PRN Keturah Leija NP           Infusions:  Current Facility-Administered Medications   Medication Dose Route Frequency Provider Last Rate Last Admin    niCARdipine 40 mg in 200 mL NS (CARDENE) infusion  0.5-15 mg/hr Intravenous Continuous Keturah Leija NP   Stopped at 10/31/24 1615       No Known Allergies    Physical Examination:  Vitals: /78   Pulse 62   Temp 98.2  F (36.8  C) (Oral)   Resp 18   Ht 1.499 m (4' 11.02\")   Wt 73.3 kg (161 lb 9.6 oz)   SpO2 100%   BMI 32.62 kg/m    General: Adult female patient, sitting up in bed  HEENT: Normocephalic, atraumatic, no icterus  Cardiac: Sinus rhythm on bedside monitor   Pulm: Unlabored, expansion symmetric, no retractions or " use of accessory muscles  Abdomen: Soft, non-distended  Extremities: Warm, no edema, appears adequately perfused  Skin: No rash or lesion observed on exposed skin  Psych: Calm and cooperative  Neuro:  Mental status: Awake, alert, attentive, oriented to self, place and circumstance with some intermittent confusion to time and date. Language is fluent and coherent with intact comprehension of complex commands, naming and repetition.  Cranial nerves: VFF, PERRL, binocular diplopia-baseline, conjugate gaze, EOMI, facial sensation intact, subtle left lower facial weakness, shoulder shrug strong, tongue midline, no dysarthria.   Motor: Normal bulk and tone. No abnormal movements. 5/5 strength in 4/4 extremities with purposeful movement.   Sensory: Intact to light touch x 4 extremities  Coordination: FNF and HS without ataxia or dysmetria.   Gait: NAOMIE, deferred.    Labs and Imaging:    Results for orders placed or performed during the hospital encounter of 10/31/24 (from the past 24 hours)   XR Abdomen Port 1 View    Narrative    EXAMINATION:  XR ABDOMEN PORT 1 VIEW 10/31/2024     COMPARISON: none..    HISTORY: constipation, no flatus.    TECHNIQUE: Frontal supine view of the abdomen.    FINDINGS: A  shunt projects traversing over the mid abdomen with  coiled loop centered within the left lower quadrant. No abnormally  dilated loops of bowel, free air, or pneumatosis in the visualized  abdomen. No portal venous gas. Poor overall penetration secondary to  patient body habitus, but there appears to be a moderate amount of  colonic stool.  The lung bases are unremarkable.       Impression    IMPRESSION:   Nonobstructive bowel gas pattern. Moderate to significant colonic  stool.    I have personally reviewed the examination and initial interpretation  and I agree with the findings.    TOBIAS BRAMBILA DO         SYSTEM ID:  [id number]   Hemoglobin A1c   Result Value Ref Range    Estimated Average Glucose 177 (H) <117 mg/dL     Hemoglobin A1C 7.8 (H) <5.7 %   Phosphorus   Result Value Ref Range    Phosphorus 2.7 2.5 - 4.5 mg/dL   Basic metabolic panel   Result Value Ref Range    Sodium 137 135 - 145 mmol/L    Potassium 3.2 (L) 3.4 - 5.3 mmol/L    Chloride 104 98 - 107 mmol/L    Carbon Dioxide (CO2) 20 (L) 22 - 29 mmol/L    Anion Gap 13 7 - 15 mmol/L    Urea Nitrogen 8.2 6.0 - 20.0 mg/dL    Creatinine 0.60 0.51 - 0.95 mg/dL    GFR Estimate >90 >60 mL/min/1.73m2    Calcium 8.8 8.8 - 10.4 mg/dL    Glucose 191 (H) 70 - 99 mg/dL   Magnesium   Result Value Ref Range    Magnesium 1.8 1.7 - 2.3 mg/dL   MR Brain w/o & w Contrast    Narrative    MR BRAIN W/O & W CONTRAST 10/31/2024 1:27 PM    Provided History: IPH, assess for underlying pathology    Comparison:  CT/CTA 10/31/2024     Technique: Sagittal T1-weighted, coronal T2-weighted, axial T2 FLAIR,  axial susceptibility weighted, and axial diffusion-weighted with ADC  map images of the brain were obtained without intravenous contrast.  After the administration of contrast, axial and coronal T1 fat  saturated images were obtained.    Findings:   Mild motion artifact degrades images. Right frontal approach  ventriculostomy catheter with tip in the mid right lateral ventricle  near the third ventricle. Extracranial portion of the shunt tubing  visualized over the right parietal cranium.    Within the left internal capsule there is a T2 hypointense and  moderately T1 hyperintense lesion suggestive of early subacute  intraparenchymal hemorrhage within this region is suggested on CT.  This region measures approximately 7 x 5 x 13 mm with a ring of  surrounding T2 FLAIR hyperintensity representative of adjacent  inflammation/cytotoxic edema, which extends towards the posterior  medial left caudate nucleus. No appreciated intracranial mass lesion,  mass effect, or midline shift. Shunted ventricles are narrowed. No  abnormality of reduced diffusion.  Normal intravascular flow voids.  Left occipital  lymphadenitis.    Postcontrast images demonstrate no abnormal intracranial enhancement.      Impression    Impression: Redemonstration of early subacute intraparenchymal  hemorrhage within the left internal capsule with ring of surrounding  inflammation/cytotoxic edema extending towards the posterior medial  aspect of the left caudate nucleus. No underlying mass lesions or  other concerning abnormality is appreciated.    I have personally reviewed the examination and initial interpretation  and I agree with the findings.    ESTELA DELUNA MD         SYSTEM ID:  [id number]   Urine Drug Screen    Narrative    The following orders were created for panel order Urine Drug Screen.  Procedure                               Abnormality         Status                     ---------                               -----------         ------                     Urine Drug Screen Panel[[id number]]      Normal              Final result                 Please view results for these tests on the individual orders.   Urine Drug Screen Panel   Result Value Ref Range    Amphetamines Urine Screen Negative Screen Negative    Barbituates Urine Screen Negative Screen Negative    Benzodiazepine Urine Screen Negative Screen Negative    Cannabinoids Urine Screen Negative Screen Negative    Cocaine Urine Screen Negative Screen Negative    Fentanyl Qual Urine Screen Negative Screen Negative    Opiates Urine Screen Negative Screen Negative    PCP Urine Screen Negative Screen Negative   Echo Complete with Bubble Study   Result Value Ref Range    LVEF  60-65%     Narrative    [id number]  [id number]  [id number]  [id number]^JOSHUA^KOREY^N     [facility],[place]  Echocardiography Laboratory  [address] [place] [id number]     Name: HERMILO LAWSON  MRN: [id number]  : 1980  Study Date: 10/31/2024 01:47 PM  Age: 44 yrs  Gender: Female  Patient Location: [facility]  Reason For Study: CVA  Ordering Physician: KOREY LEWIS  N  Referring Physician: RUBIN MORRIS  Performed By: Loretta Castro RDCS     BSA: 1.7 m2  Height: 59 in  Weight: 157 lb  HR: 84  BP: 122/74 mmHg  ______________________________________________________________________________  Procedure  Bubble Echocardiogram with two-dimensional, color and spectral Doppler  performed. Contrast Definity. Technically difficult study. Definity (NDC  #26517-466-06) given intravenously. Patient was given 6ml mixture of 1.5ml  Definity and 8.5ml saline. 4 ml wasted.  ______________________________________________________________________________  Interpretation Summary  Left ventricular size, wall motion and function are normal. The ejection  fraction is 60-65%.  Right ventricular function, chamber size, wall motion, and thickness are  normal.  Late bubbles noted in the left ventricle; intrapulmonary shunt more likely  than intercardiac.  No significant valvular abnormalities present.  The inferior vena cava was normal in size with preserved respiratory  variability.  There is no prior study for direct comparison.  ______________________________________________________________________________  Left Ventricle  Left ventricular size, wall motion and function are normal. The ejection  fraction is 60-65%. Left ventricular diastolic function is normal. There is no  thrombus seen in the left ventricle.     Right Ventricle  Right ventricular function, chamber size, wall motion, and thickness are  normal.     Atria  Both atria appear normal. Late bubbles noted in the left ventricle;  intrapulmonary shunt more likely than intercardiac.     Mitral Valve  The mitral valve is normal.     Aortic Valve  Aortic valve is normal in structure and function. The aortic valve is  tricuspid.     Tricuspid Valve  The tricuspid valve is normal. Trace tricuspid insufficiency is present. The  peak velocity of the tricuspid regurgitant jet is not obtainable.     Pulmonic Valve  The pulmonic valve is  normal.     Vessels  The aorta root is normal. The thoracic aorta is normal. The inferior vena cava  was normal in size with preserved respiratory variability.     Pericardium  No pericardial effusion is present.     Miscellaneous  No significant valvular abnormalities present.     Compared to Previous Study  There is no prior study for direct comparison.     Attestation  I have personally viewed the imaging and agree with the interpretation and  report as documented by the fellow, Danial Young, and/or edited by me.  ______________________________________________________________________________  MMode/2D Measurements & Calculations  IVSd: 0.89 cm  LVIDd: 4.4 cm  LVIDs: 2.4 cm  LVPWd: 0.92 cm  FS: 46.6 %  LV mass(C)d: 129.5 grams  LV mass(C)dI: 77.9 grams/m2  Ao root diam: 2.7 cm  asc Aorta Diam: 2.7 cm  LVOT diam: 2.0 cm  LVOT area: 3.0 cm2  Ao root diam index Ht(cm/m): 1.8  Ao root diam index BSA (cm/m2): 1.6  Asc Ao diam index BSA (cm/m2): 1.6  Asc Ao diam index Ht(cm/m): 1.8     RWT: 0.42  TAPSE: 1.3 cm     Doppler Measurements & Calculations  MV E max yoan: 69.4 cm/sec  MV A max yoan: 55.0 cm/sec  MV E/A: 1.3  MV dec slope: 547.3 cm/sec2  MV dec time: 0.13 sec  PA acc time: 0.11 sec  E/E' av.5  Lateral E/e': 6.4  Medial E/e': 8.5  RV S Yoan: 15.2 cm/sec     ______________________________________________________________________________  Report approved by: Kirby Linton 10/31/2024 02:56 PM         Glucose by meter   Result Value Ref Range    GLUCOSE BY METER POCT 216 (H) 70 - 99 mg/dL   XR Shunt Malfunction Survey    Narrative    Exam: XR SHUNT MALFUNCTION SURVEY, 10/31/2024 5:17 PM    Indication: shunt placed at OSH  of unknown type    Comparison: CT head same date    Findings:   Right temporoparietal approach ventriculoperitoneal shunt catheter  with intracranial tip at midline. Shunt exits the skull and traverses  postero laterally through the neck and through the thorax and abdomen  before  looping within the left lower quadrant. The tip is visualized  free floating within the left upper quadrant. No kinking or disruption  of the shunt tubing. Dense metallic coiling at the skull base  consistent with basilar tip aneurysm coiling. No focal bony  abnormalities within the included fields view.      Impression    Impression:  shunt in usual position without evidence of disruption.    I have personally reviewed the examination and initial interpretation  and I agree with the findings.    MARNIE NUNN MD         SYSTEM ID:  [id number]   Glucose by meter   Result Value Ref Range    GLUCOSE BY METER POCT 150 (H) 70 - 99 mg/dL   Potassium   Result Value Ref Range    Potassium 3.8 3.4 - 5.3 mmol/L   Glucose by meter   Result Value Ref Range    GLUCOSE BY METER POCT 154 (H) 70 - 99 mg/dL   CBC with platelets   Result Value Ref Range    WBC Count 8.0 4.0 - 11.0 10e3/uL    RBC Count 4.91 3.80 - 5.20 10e6/uL    Hemoglobin 14.3 11.7 - 15.7 g/dL    Hematocrit 44.3 35.0 - 47.0 %    MCV 90 78 - 100 fL    MCH 29.1 26.5 - 33.0 pg    MCHC 32.3 31.5 - 36.5 g/dL    RDW 13.3 10.0 - 15.0 %    Platelet Count 353 150 - 450 10e3/uL   Basic metabolic panel   Result Value Ref Range    Sodium 140 135 - 145 mmol/L    Potassium 3.9 3.4 - 5.3 mmol/L    Chloride 106 98 - 107 mmol/L    Carbon Dioxide (CO2) 23 22 - 29 mmol/L    Anion Gap 11 7 - 15 mmol/L    Urea Nitrogen 8.6 6.0 - 20.0 mg/dL    Creatinine 0.75 0.51 - 0.95 mg/dL    GFR Estimate >90 >60 mL/min/1.73m2    Calcium 8.9 8.8 - 10.4 mg/dL    Glucose 155 (H) 70 - 99 mg/dL   Magnesium   Result Value Ref Range    Magnesium 2.1 1.7 - 2.3 mg/dL   Phosphorus   Result Value Ref Range    Phosphorus 2.5 2.5 - 4.5 mg/dL   Glucose by meter   Result Value Ref Range    GLUCOSE BY METER POCT 172 (H) 70 - 99 mg/dL       All relevant imaging and laboratory values personally reviewed.

## 2024-11-01 NOTE — PROGRESS NOTES
Discharged to: home at 1645  Belongings: sent with patient  AVS (After Visit Summary) discussed with: patient

## 2024-11-01 NOTE — PROGRESS NOTES
Stroke Education Note    The following information was reviewed with patient:    - Activation of emergency medical system (when to call 911)    - Individualized risk factors for stroke:      high blood pressure     physical inactivity     History of stroke    - Warning signs and symptoms of stroke:   B = Balance loss   E = Eyesight changes   F = Facial droop or numbness   A = Arm or leg weakness   S = Speech difficulty, slurred speech   T = Time to call 911 for help    Written stroke educational materials given:   - Learning about BE FAST: Stroke Warning Signs and Learning about Risk Factors for Stroke (Healthwise)   - Understanding Stroke: Key Resources After a Stroke (FOD #784845)    Learner's response to education:     not applicable, precontemplative     Kavitha Portillo RN

## 2024-11-01 NOTE — PLAN OF CARE
Pt is alert but confused. Intermittently disoriented to place and time. Reorientation provided. Follows commands. Double vision at baseline. PERRL. Right eye more sluggish than left - baseline from previous CVA. Right hand tingling/numbness. Stand by assist with walker. Bed alarm on. Q2h neuro checks. SBP < 140. NSR with HR 60-70s. Afebrile. 2+ pulses. On RA with sats > 95%. LS clear. No SOB. Regular diet. BM x1 overnight. Voids spontaneously w/o difficulty in commode. ACHS blood glucose checks. Phos replaced this AM. 2x PIVs.    Plan: Continue monitoring neuro checks and continue with plan of care. Notify team with any changes or updates.    For vital signs and complete assessments, please see documentation flowsheets.              Problem: Adult Inpatient Plan of Care  Goal: Absence of Hospital-Acquired Illness or Injury  Intervention: Identify and Manage Fall Risk  Recent Flowsheet Documentation  Taken 11/1/2024 0400 by Jacqueline Carmichael, RN  Safety Promotion/Fall Prevention:   activity supervised   assistive device/personal items within reach   clutter free environment maintained   increased rounding and observation   increase visualization of patient   lighting adjusted   mobility aid in reach   nonskid shoes/slippers when out of bed   patient and family education   room door open   room near nurse's station   room organization consistent   safety round/check completed   supervised activity  Taken 11/1/2024 0000 by Jacqueline Carmichael, RN  Safety Promotion/Fall Prevention:   activity supervised   assistive device/personal items within reach   clutter free environment maintained   increased rounding and observation   increase visualization of patient   lighting adjusted   mobility aid in reach   nonskid shoes/slippers when out of bed   patient and family education   room door open   room near nurse's station   room organization consistent   safety round/check completed   supervised activity  Taken 10/31/2024 2000 by  Jacqueline Carmichael, RN  Safety Promotion/Fall Prevention:   activity supervised   assistive device/personal items within reach   clutter free environment maintained   increased rounding and observation   increase visualization of patient   lighting adjusted   mobility aid in reach   nonskid shoes/slippers when out of bed   patient and family education   room door open   room near nurse's station   room organization consistent   safety round/check completed   supervised activity

## 2024-11-01 NOTE — PLAN OF CARE
Goal Outcome Evaluation:      Plan of Care Reviewed With: patient    Overall Patient Progress: improvingOverall Patient Progress: improving    Outcome Evaluation: pt will discharge to home with home care servcie and resumption of PCA service.

## 2024-11-01 NOTE — DISCHARGE SUMMARY
[facility]  NEUROLOGY DISCHARGE SUMMARY    Patient Name:  Trinh Dobson  MRN:  [id number]      :  1980      Date of Admission:  10/31/2024  Date of Discharge:  No discharge date for patient encounter.  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     Hospital Course:  She presented to [facility] on 10/31/2024 with ~ 5 days of right sided numbness and weakness. She has also had light headedness for couple of days that resulted in a fall yesterday. Presenting BP was 187/108; she was started on nicardipine to keep SBP < 140. She was found to have a left thalamic IPH.     Of note, she presented to [facility] on 10/29 with a 3 day history of right sided numbness and generalized weakness. She was found to have hypokalemia for which she was given oral supplementation. Brain imaging was not done at that time.    While at [facility] in the Neuro ICU she briefly required a Nicardipine infusion for high blood pressure. Her left thalamic bleed was likely secondary to her high blood pressure. We did increase her home Amlodipine to 10 mg daily and stopped her home Metoprolol medication in exchange for Carvedilol 12.5 mg BID. We explained the importance of blood pressure management going forward. It was noted that she had  previously taken Atorvastatin 10 mg in the past but this medication was not listed as an active medication that she is currently taking. An LDL direct was obtained and was noted to be elevated at 149. Discussed this with patient, she remembers taking this in the past but doesn't remember why she stopped. She was ok with resuming at her previous prescribed dose of 10 mg every evening. She was instructed to see her primary care provider within the next 2 weeks to discuss medications changes and follow up on blood pressure.  She was also seen by PT who recommended home PT. Patient has had frequent falls and currently walks with a walker. She does have a PCA at home. Our care coordinator was able to meet with her and a referral for home care was made and accepted.     Pertinent Investigations:  MR brain w/o & w/contrast on 10/31/2024  Impression:   1. Redemonstration of early subacute intraparenchymal  hemorrhage within the left internal capsule with ring of surrounding  inflammation/cytotoxic edema extending towards the posterior medial  aspect of the left caudate nucleus. No underlying mass lesions or  other concerning abnormality is appreciated.    CT head w/o contrast on 10/31/2024  CTA head neck w/contrast on 10/31/2024  Impression:   HEAD CT:  1.  Acute intraparenchymal hemorrhage in the left thalamus/internal capsule measuring 6 x 7 x 14 mm in diameter.  2.  Nondilated shunted ventricular configuration.  3.  Chronic changes, as described.     HEAD CTA:   1.  Irregular stenoses of the KIMBERLEE and MCA branches, which may reflect an atypical presentation of vasospasm given the presence of hemorrhage. Additional considerations would include RCVS, vasculitis, or less likely age-advanced atherosclerosis.     NECK CTA:  1.  No hemodynamically significant stenosis in the neck vessels.   2.  No evidence for dissection.    Consultations:    Neurosurgery  Care coordinator      Recommendations and Follow-up:  1. Follow up with  "primary care provider within 7-14 days for hospital follow- up and blood pressure management. No follow up labs or test are needed.     2. Follow up in Stroke Clinic within 4-6 weeks. This referral has been made for you and you should be contacted about this appointment. No follow up labs or test are needed.     3. A referral has also been made for home skilled nursing and PT to work with medication management, blood pressure monitoring, ambulation, and home safety.     Discharge physical examination:   /77   Pulse 65   Temp 98.7  F (37.1  C) (Oral)   Resp 18   Ht 1.499 m (4' 11.02\")   Wt 73.3 kg (161 lb 9.6 oz)   SpO2 100%   BMI 32.62 kg/m    General: Adult female patient, sitting up in bed  HEENT: Normocephalic, atraumatic, no icterus  Cardiac: Sinus rhythm on bedside monitor   Pulm: Unlabored, expansion symmetric, no retractions or use of accessory muscles  Abdomen: Soft, non-distended  Extremities: Warm, no edema, appears adequately perfused  Skin: No rash or lesion observed on exposed skin  Psych: Calm and cooperative  Neuro:  Mental status: Awake, alert, attentive, oriented to self, place and circumstance with some intermittent confusion to time and date. Language is fluent and coherent with intact comprehension of complex commands, naming and repetition.  Cranial nerves: VFF, PERRL, binocular diplopia-baseline, conjugate gaze, EOMI, facial sensation intact, subtle left lower facial weakness, shoulder shrug strong, tongue midline, no dysarthria.   Motor: Normal bulk and tone. No abnormal movements. 5/5 strength in 4/4 extremities with purposeful movement.   Sensory: Intact to light touch x 4 extremities  Coordination: FNF and HS without ataxia or dysmetria.   Gait: NAOMIE, deferred.    Discharge Medications:  Current Discharge Medication List        START taking these medications    Details   carvedilol (COREG) 12.5 MG tablet Take 1 tablet (12.5 mg) by mouth 2 times daily (with meals).  Qty: 30 " tablet, Refills: 0    Associated Diagnoses: Hypertension goal BP (blood pressure) < 140/90           CONTINUE these medications which have CHANGED    Details   amLODIPine (NORVASC) 10 MG tablet Take 1 tablet (10 mg) by mouth daily.  Qty: 30 tablet, Refills: 0    Associated Diagnoses: Hypertension goal BP (blood pressure) < 140/90      atorvastatin (LIPITOR) 10 MG tablet Take 1 tablet (10 mg) by mouth every evening.  Qty: 30 tablet, Refills: 0    Associated Diagnoses: Hyperlipidemia, unspecified hyperlipidemia type           CONTINUE these medications which have NOT CHANGED    Details   citalopram (CELEXA) 20 MG tablet Take 20 mg by mouth daily.      empagliflozin (JARDIANCE) 10 MG TABS tablet Take 10 mg by mouth daily.           STOP taking these medications       metoprolol succinate ER (TOPROL XL) 100 MG 24 hr tablet Comments:   Reason for Stopping:               Discharge follow up and instructions:     Home Care Referral      Reason for your hospital stay    Left thalamic IP     Activity    Your activity upon discharge: activity as tolerated     Adult [facility] Follow-up and recommended labs and tests    Follow up with primary care provider within 7-14 days for hospital follow- up and blood pressure management.  No follow up labs or test are needed.      Follow up in Stroke Clinic within 4-6 weeks. This referral has been made for you and you should be contacted about this appointment. No follow up labs or test are needed.    A referral has also been made for home PT to work with you on your ambulation and home safety.      Appointments on [place] and/or [facility] (with [facility] or [facility] provider or service). Call [phone] if you haven't heard regarding these appointments within 7 days of discharge.     Diet    Follow this diet upon discharge: Current Diet:Orders Placed This Encounter      Combination Diet Regular Diet; Moderate Consistent Carb (60 g CHO per Meal) Diet; Thin Liquids (level 0)     Stroke  Hospital Follow Up    Please be aware that coverage of these services is subject to the terms and limitations of your health insurance plan.  Call member services at your health plan with any benefit or coverage questions.  [facility] will call you to coordinate care as prescribed by your provider. If you don t hear from a representative within 2 business days, please call [phone].         Patient seen and discussed with Dr. Liz MOORE, CNP  NeuroCritical Care Nurse Practitioner  Pager: [phone]  Ascom: *[phone] available [place] [phone] to [phone]

## 2024-11-01 NOTE — PROGRESS NOTES
Admitted/transferred from:   2 RN full   skin assessment completed by Mahsa Freed RN and hubert.  Skin assessment finding: skin intact, no problems   Interventions/actions: other NA skin intact      Will continue to monitor.

## 2024-11-01 NOTE — PLAN OF CARE
After chart review and conversation with PT post PT evaluation as well as observation of this patient it is concluded that this patient has no acute OT needs.  Patient currently up in room modified independent with dressing and with no new apparent cognitive deficits.  Patient may benefit from outpatient occupational therapy along with physical therapy to continue to work on functional independence.  Defer acute OT at this time

## 2024-11-01 NOTE — CONSULTS
Care Management Initial Consult    General Information  Assessment completed with: PatientTrinh  Type of CM/SW Visit: Initial Assessment    Primary Care Provider verified and updated as needed: Yes (Dr. Page- [facility])   Readmission within the last 30 days: no previous admission in last 30 days         Advance Care Planning: Advance Care Planning Reviewed: no concerns identified        Communication Assessment  Patient's communication style: spoken language (English or Bilingual)    Hearing Difficulty or Deaf: no   Wear Glasses or Blind: yes    Cognitive  Cognitive/Neuro/Behavioral: .WDL except  Level of Consciousness: alert  Arousal Level: opens eyes spontaneously  Orientation: oriented x 4  Mood/Behavior: calm, cooperative  Best Language: 0 - No aphasia  Speech: clear, spontaneous, logical    Living Environment:   People in home: alone     Current living Arrangements: apartment      Able to return to prior arrangements: yes     Family/Social Support:  Care provided by: self, other (see comments) (PCA)  Provides care for: no one, unable/limited ability to care for self  Marital Status: Single  Support system: Friend, Other (specify), PCA (Cousins)          Description of Support System: Supportive, Involved       Current Resources:   Patient receiving home care services: No     Community Resources: [employer], Housekeeping/Chore Agency, PCA, Transportation Services (pt stated she has free transportation from her insurance and gets $600 for Lyft ride)  Equipment currently used at home: walker, rolling  Supplies currently used at home: None    Employment/Financial:  Employment Status: disabled        Financial Concerns: none   Referral to Financial Worker: No    Does the patient's insurance plan have a 3 day qualifying hospital stay waiver?  No    Lifestyle & Psychosocial Needs:  Social Drivers of Health     Food Insecurity: Not on file   Depression: At risk (4/26/2019)    Received from  [facility]    PHQ-2     PHQ-2 Score: 6   Housing Stability: Not on file   Tobacco Use: Unknown (10/31/2024)    Patient History     Smoking Tobacco Use: Never     Smokeless Tobacco Use: Unknown     Passive Exposure: Not on file   Financial Resource Strain: Not on file   Alcohol Use: Not on file   Transportation Needs: Not on file   Physical Activity: Not on file   Interpersonal Safety: Not on file   Stress: Not on file   Social Connections: Not on file   Health Literacy: Not on file     Functional Status:  Prior to admission patient needed assistance:   Dependent ADLs:: Ambulation-walker  Dependent IADLs:: Cooking, Laundry, Shopping, Meal Preparation, Transportation, Medication Management, Cleaning     Mental Health Status:  Mental Health Status: No Current Concerns       Chemical Dependency Status:  Chemical Dependency Status: No Current Concerns           Values/Beliefs:  Spiritual, Cultural Beliefs, Congregation Practices, Values that affect care:               Discussed  Partnership in Safe Discharge Planning  document with patient/family: Yes: Discussed with pt.    Additional Information:  Care management consulted to complete assessment for stroke admission and assess discharge planning.    RNCC visited pt to assess discharge needs and complete assessment.  Pt lives alone.  Pt stated she has PCA service and home making.  Pt cousin is her PCA.  Pt is not sure how may PCA hrs she receiving  Pt stated her PCA helps her with meal prep, cleaning and shopping.  Pt stated she has free transportation service from her insurance and gets $ 600 for Lyft ride service.  RNCC discussed about home care service for RN and PT.  Pt agreed referral to be send to any home care agency that can provide the service.  Pt thinks her PCA agency name is [facility] and not sure if they provide skilled service.  RNCC called [facility] # [phone].  The office is closed and per their website, they provide only PCA service.       RNCC sent referral to [facility] referral line for RN and PT service.  [facility] home care reported, pt has been accepted by [facility].  RNCC shared the plan.  The above plan have been shared with Neuro ICU team, bedside RN and pt.     Next Steps:   Pt will discharge to home today with home care service, RN and PT.  Pt cousin will provide transportation.    [facility]- RN and PT  Ph- [phone]  Fax- [phone]      Roshni White RN, PHN, BSN  4A and 4E/ ICU  Care Coordinator  Phone: [phone]  Pager: [phone]      SEARCHABLE in [facility] - search CARE COORDINATOR       [place] ([phone]) Saturday & Sunday; ([phone]) [facility] Recognized Holidays     Units: 5A Onc Vocera & 5C Vocera Pager: [phone]    Units: 6B Vocera & 6C Vocera  Pager: [phone]    Units: 7A SOT RNCC Vocera, 7B Med Surg Vocera, & 7C Med Surg Vocera  Pager: [phone]    Units: 6A Vocera & 4A CVICU Vocera, 4C MICU Vocera, and 4E SICU Vocera   Pager: [phone]    Units: 5 Ortho Vocera & 5 Med Surg Vocera  Pager: [phone]    Units: 6 Med Surg Vocera & 8 Med Surg Vocera  Pager [phone]

## 2024-11-03 ENCOUNTER — PATIENT OUTREACH (OUTPATIENT)
Dept: CARE COORDINATION | Facility: CLINIC | Age: 44
End: 2024-11-03
Payer: MEDICARE

## 2024-11-03 NOTE — PROGRESS NOTES
Connected Care Resource Center Contact  [facility]/Voicemail     Clinical Data: Post-Discharge Outreach     Outreach attempted x 2.  Left message on patient's voicemail, providing [facility]'s central phone number of [phone] ([phone]) for questions/concerns and/or to schedule an appt with an [facility] provider, if they do not have a PCP.      Plan:  [facility] will do no further outreaches at this time.       ADENIKE White  Connected Care Resource [place], [facility]    *Connected Care Resource Team does NOT follow patient ongoing. Referrals are identified based on internal discharge reports and the outreach is to ensure patient has an understanding of their discharge instructions.

## 2024-11-05 LAB — GLUCOSE BLDC GLUCOMTR-MCNC: 187 MG/DL (ref 70–99)

## 2025-03-11 ENCOUNTER — APPOINTMENT (OUTPATIENT)
Dept: GENERAL RADIOLOGY | Facility: CLINIC | Age: 45
End: 2025-03-11
Attending: EMERGENCY MEDICINE
Payer: MEDICARE

## 2025-03-11 ENCOUNTER — APPOINTMENT (OUTPATIENT)
Dept: CT IMAGING | Facility: CLINIC | Age: 45
End: 2025-03-11
Attending: EMERGENCY MEDICINE
Payer: MEDICARE

## 2025-03-11 ENCOUNTER — HOSPITAL ENCOUNTER (EMERGENCY)
Facility: CLINIC | Age: 45
Discharge: HOME | End: 2025-03-12
Attending: EMERGENCY MEDICINE | Admitting: EMERGENCY MEDICINE
Payer: MEDICARE

## 2025-03-11 DIAGNOSIS — I10 HYPERTENSION, UNSPECIFIED TYPE: ICD-10-CM

## 2025-03-11 DIAGNOSIS — H53.2 DIPLOPIA: ICD-10-CM

## 2025-03-11 DIAGNOSIS — R26.89 BALANCE PROBLEMS: ICD-10-CM

## 2025-03-11 LAB
ALBUMIN UR-MCNC: NEGATIVE MG/DL
AMPHETAMINES UR QL SCN: NORMAL
ANION GAP SERPL CALCULATED.3IONS-SCNC: 11 MMOL/L (ref 7–15)
APPEARANCE UR: CLEAR
APTT PPP: 28 SECONDS (ref 22–38)
BARBITURATES UR QL SCN: NORMAL
BASOPHILS # BLD AUTO: 0.1 10E3/UL (ref 0–0.2)
BASOPHILS NFR BLD AUTO: 1 %
BENZODIAZ UR QL SCN: NORMAL
BILIRUB UR QL STRIP: NEGATIVE
BUN SERPL-MCNC: 11.5 MG/DL (ref 6–20)
BZE UR QL SCN: NORMAL
CALCIUM SERPL-MCNC: 9.4 MG/DL (ref 8.8–10.4)
CANNABINOIDS UR QL SCN: NORMAL
CHLORIDE SERPL-SCNC: 104 MMOL/L (ref 98–107)
COLOR UR AUTO: ABNORMAL
CREAT SERPL-MCNC: 0.82 MG/DL (ref 0.51–0.95)
EGFRCR SERPLBLD CKD-EPI 2021: 89 ML/MIN/1.73M2
EOSINOPHIL # BLD AUTO: 0.1 10E3/UL (ref 0–0.7)
EOSINOPHIL NFR BLD AUTO: 1 %
ERYTHROCYTE [DISTWIDTH] IN BLOOD BY AUTOMATED COUNT: 13.2 % (ref 10–15)
FENTANYL UR QL: NORMAL
GLUCOSE BLDC GLUCOMTR-MCNC: 142 MG/DL (ref 70–99)
GLUCOSE SERPL-MCNC: 153 MG/DL (ref 70–99)
GLUCOSE UR STRIP-MCNC: 300 MG/DL
HCG SERPL QL: NEGATIVE
HCG UR QL: NEGATIVE
HCO3 SERPL-SCNC: 23 MMOL/L (ref 22–29)
HCT VFR BLD AUTO: 42.1 % (ref 35–47)
HGB BLD-MCNC: 14 G/DL (ref 11.7–15.7)
HGB UR QL STRIP: NEGATIVE
IMM GRANULOCYTES # BLD: 0 10E3/UL
IMM GRANULOCYTES NFR BLD: 0 %
INR PPP: 0.97 (ref 0.85–1.15)
KETONES UR STRIP-MCNC: NEGATIVE MG/DL
LEUKOCYTE ESTERASE UR QL STRIP: NEGATIVE
LYMPHOCYTES # BLD AUTO: 2.9 10E3/UL (ref 0.8–5.3)
LYMPHOCYTES NFR BLD AUTO: 31 %
MCH RBC QN AUTO: 29.8 PG (ref 26.5–33)
MCHC RBC AUTO-ENTMCNC: 33.3 G/DL (ref 31.5–36.5)
MCV RBC AUTO: 90 FL (ref 78–100)
MONOCYTES # BLD AUTO: 0.7 10E3/UL (ref 0–1.3)
MONOCYTES NFR BLD AUTO: 7 %
MUCOUS THREADS #/AREA URNS LPF: PRESENT /LPF
NEUTROPHILS # BLD AUTO: 5.5 10E3/UL (ref 1.6–8.3)
NEUTROPHILS NFR BLD AUTO: 60 %
NITRATE UR QL: NEGATIVE
NRBC # BLD AUTO: 0 10E3/UL
NRBC BLD AUTO-RTO: 0 /100
OPIATES UR QL SCN: NORMAL
PCP QUAL URINE (ROCHE): NORMAL
PH UR STRIP: 6.5 [PH] (ref 5–7)
PLATELET # BLD AUTO: 338 10E3/UL (ref 150–450)
POTASSIUM SERPL-SCNC: 3.8 MMOL/L (ref 3.4–5.3)
RBC # BLD AUTO: 4.7 10E6/UL (ref 3.8–5.2)
RBC URINE: <1 /HPF
SODIUM SERPL-SCNC: 138 MMOL/L (ref 135–145)
SP GR UR STRIP: 1.01 (ref 1–1.03)
SQUAMOUS EPITHELIAL: 3 /HPF
TROPONIN T SERPL HS-MCNC: 7 NG/L
UROBILINOGEN UR STRIP-MCNC: NORMAL MG/DL
WBC # BLD AUTO: 9.2 10E3/UL (ref 4–11)
WBC URINE: 2 /HPF

## 2025-03-11 PROCEDURE — 0042T CT HEAD PERFUSION W CONTRAST: CPT

## 2025-03-11 PROCEDURE — 93005 ELECTROCARDIOGRAM TRACING: CPT

## 2025-03-11 PROCEDURE — 85004 AUTOMATED DIFF WBC COUNT: CPT | Performed by: EMERGENCY MEDICINE

## 2025-03-11 PROCEDURE — 81001 URINALYSIS AUTO W/SCOPE: CPT | Performed by: EMERGENCY MEDICINE

## 2025-03-11 PROCEDURE — 80048 BASIC METABOLIC PNL TOTAL CA: CPT | Performed by: EMERGENCY MEDICINE

## 2025-03-11 PROCEDURE — 70450 CT HEAD/BRAIN W/O DYE: CPT

## 2025-03-11 PROCEDURE — 82962 GLUCOSE BLOOD TEST: CPT

## 2025-03-11 PROCEDURE — 80307 DRUG TEST PRSMV CHEM ANLYZR: CPT | Performed by: EMERGENCY MEDICINE

## 2025-03-11 PROCEDURE — 82310 ASSAY OF CALCIUM: CPT | Performed by: EMERGENCY MEDICINE

## 2025-03-11 PROCEDURE — 71045 X-RAY EXAM CHEST 1 VIEW: CPT

## 2025-03-11 PROCEDURE — 99207 PR NO BILLABLE SERVICE THIS VISIT: CPT | Performed by: STUDENT IN AN ORGANIZED HEALTH CARE EDUCATION/TRAINING PROGRAM

## 2025-03-11 PROCEDURE — 99285 EMERGENCY DEPT VISIT HI MDM: CPT | Mod: 25

## 2025-03-11 PROCEDURE — 70496 CT ANGIOGRAPHY HEAD: CPT

## 2025-03-11 PROCEDURE — 74018 RADEX ABDOMEN 1 VIEW: CPT

## 2025-03-11 PROCEDURE — 84484 ASSAY OF TROPONIN QUANT: CPT | Performed by: EMERGENCY MEDICINE

## 2025-03-11 PROCEDURE — 84703 CHORIONIC GONADOTROPIN ASSAY: CPT | Performed by: EMERGENCY MEDICINE

## 2025-03-11 PROCEDURE — 81025 URINE PREGNANCY TEST: CPT | Performed by: EMERGENCY MEDICINE

## 2025-03-11 PROCEDURE — 85730 THROMBOPLASTIN TIME PARTIAL: CPT | Performed by: EMERGENCY MEDICINE

## 2025-03-11 PROCEDURE — 85018 HEMOGLOBIN: CPT | Performed by: EMERGENCY MEDICINE

## 2025-03-11 PROCEDURE — 36415 COLL VENOUS BLD VENIPUNCTURE: CPT | Performed by: EMERGENCY MEDICINE

## 2025-03-11 PROCEDURE — 85610 PROTHROMBIN TIME: CPT | Performed by: EMERGENCY MEDICINE

## 2025-03-11 PROCEDURE — 250N000011 HC RX IP 250 OP 636: Performed by: EMERGENCY MEDICINE

## 2025-03-11 PROCEDURE — 250N000009 HC RX 250: Performed by: EMERGENCY MEDICINE

## 2025-03-11 RX ORDER — IOPAMIDOL 755 MG/ML
117 INJECTION, SOLUTION INTRAVASCULAR ONCE
Status: COMPLETED | OUTPATIENT
Start: 2025-03-11 | End: 2025-03-11

## 2025-03-11 RX ADMIN — SODIUM CHLORIDE 95 ML: 9 INJECTION, SOLUTION INTRAVENOUS at 22:03

## 2025-03-11 RX ADMIN — IOPAMIDOL 117 ML: 755 INJECTION, SOLUTION INTRAVENOUS at 22:00

## 2025-03-11 ASSESSMENT — ACTIVITIES OF DAILY LIVING (ADL)
ADLS_ACUITY_SCORE: 57
ADLS_ACUITY_SCORE: 57

## 2025-03-11 ASSESSMENT — COLUMBIA-SUICIDE SEVERITY RATING SCALE - C-SSRS
1. IN THE PAST MONTH, HAVE YOU WISHED YOU WERE DEAD OR WISHED YOU COULD GO TO SLEEP AND NOT WAKE UP?: NO
6. HAVE YOU EVER DONE ANYTHING, STARTED TO DO ANYTHING, OR PREPARED TO DO ANYTHING TO END YOUR LIFE?: NO
2. HAVE YOU ACTUALLY HAD ANY THOUGHTS OF KILLING YOURSELF IN THE PAST MONTH?: NO

## 2025-03-12 VITALS
OXYGEN SATURATION: 100 % | BODY MASS INDEX: 36.29 KG/M2 | HEART RATE: 87 BPM | HEIGHT: 59 IN | RESPIRATION RATE: 18 BRPM | TEMPERATURE: 98 F | SYSTOLIC BLOOD PRESSURE: 184 MMHG | WEIGHT: 180 LBS | DIASTOLIC BLOOD PRESSURE: 108 MMHG

## 2025-03-12 LAB
ATRIAL RATE - MUSE: 55 BPM
DIASTOLIC BLOOD PRESSURE - MUSE: NORMAL MMHG
INTERPRETATION ECG - MUSE: NORMAL
P AXIS - MUSE: 41 DEGREES
PR INTERVAL - MUSE: 178 MS
QRS DURATION - MUSE: 80 MS
QT - MUSE: 436 MS
QTC - MUSE: 417 MS
R AXIS - MUSE: -5 DEGREES
SYSTOLIC BLOOD PRESSURE - MUSE: NORMAL MMHG
T AXIS - MUSE: 23 DEGREES
TROPONIN T SERPL HS-MCNC: 8 NG/L
VENTRICULAR RATE- MUSE: 55 BPM

## 2025-03-12 PROCEDURE — A9585 GADOBUTROL INJECTION: HCPCS | Performed by: EMERGENCY MEDICINE

## 2025-03-12 PROCEDURE — 84484 ASSAY OF TROPONIN QUANT: CPT | Performed by: EMERGENCY MEDICINE

## 2025-03-12 PROCEDURE — 36415 COLL VENOUS BLD VENIPUNCTURE: CPT | Performed by: EMERGENCY MEDICINE

## 2025-03-12 PROCEDURE — 255N000002 HC RX 255 OP 636: Performed by: EMERGENCY MEDICINE

## 2025-03-12 RX ORDER — GADOBUTROL 604.72 MG/ML
8 INJECTION INTRAVENOUS ONCE
Status: COMPLETED | OUTPATIENT
Start: 2025-03-12 | End: 2025-03-12

## 2025-03-12 RX ADMIN — GADOBUTROL 8 ML: 604.72 INJECTION INTRAVENOUS at 02:26

## 2025-03-12 ASSESSMENT — ACTIVITIES OF DAILY LIVING (ADL)
ADLS_ACUITY_SCORE: 57

## 2025-03-12 NOTE — ED NOTES
Pt resting comfortable in bed. Up to the bathroom independently. No dizziness or nausea at this time.

## 2025-03-12 NOTE — ED NOTES
Bed: ED17  Expected date: 3/11/25  Expected time: 9:50 PM  Means of arrival:   Comments:  Tier 2 after CT

## 2025-03-12 NOTE — ED TRIAGE NOTES
Patient c/o increasing blood pressure and dizziness over the past two days. Had a nurse at home and was told to come to the ED. Patient states the dizziness has been so bad she almost fell at home. States she's taken her BP meds at home. ABCS intact.     Patient also mentioned that she is constipated and is having toe pain.

## 2025-03-12 NOTE — PROGRESS NOTES
Contacted regarding 43 yo female with hx of  shunt placed in [place] in 2013 for basilar aneurysm and  shunt placement presenting to [facility] with elevated BP, dizziness and diplopia over last two days.  (3rd nerve palsy and intermittent diplopia at baseline)     Imaging:    Head CT:                                                              IMPRESSION:  1.  No CT evidence for acute intracranial process.  2.  Chronic changes as above.    XR shunt series:    IMPRESSION: Shunt tube is intact over the calvarium, neck, chest, and abdomen. No kinks or breaks. No significant incidental findings.       RECOMMENDATIONS:  Brain MRI with and without contrast for further evaluation.  Stroke neurology has been consulted.    JONATHON Sanchez  [facility] Neurosurgery  [address] [address]    Tel [phone]  Pager [phone]

## 2025-03-12 NOTE — CONSULTS
[facility]    Stroke Telephone Note    I was called by Tonny Hoskins Md on 03/11/25 regarding patient Trinh Dobson. The patient is a 44 year old woman with h/o HTN, recent left thalamic IPH (10/2024), Basilar aneurysm rupture s/p clipping (2013, care was in [place], has residual weakness and uses cane to ambulate and has 3rd nerve palsy),  shunt, seizures in the setting of BA rupture (currently not on antiepileptics), who presents with dizziness that started 2 days ago and double vision that started between 14:00 - 15:00 today. Mild frontal Headache+. She states that she has doubled vision periodically at baseline and that earlier today she almost fell due to dizziness associated with her altered vision. Exam at ED shows evidence of vertical diplopia and unsteady on standing with no other focal neurological deficits. /107.    Vitals  BP: (!) 193/107   Pulse: 64   Resp: 18   Temp: 98  F (36.7  C)        Stroke Code Data (for stroke code without tele)  Stroke code activated 03/11/25  2151   Stroke provider first response 03/11/25 2153   Last known normal 03/11/25  1400      Time of discovery (or onset of symptoms) 03/11/25  1400   Head CT read by Stroke Neuro Provider 03/11/25 2202   Was stroke code de-escalated? Yes  03/11/25        Imaging Findings  CT head: No acute ischemia/hemorrhage  CTA head/neck: No LVO or critical stenoses. Prior coiling of a basilar tip aneurysm resulting in significant streak artifact.  CTP: No perfusion deficits    Intravenous Thrombolysis  Not given due to:   - history of intracranial hemorrhage  - minor/isolated/quickly resolving symptoms  - unclear or unfavorable risk-benefit profile for extended window thrombolysis beyond the conventional 4.5 hour time window    Endovascular Treatment  Not initiated due to absence of proximal vessel occlusion    Impression  Dizziness x 2 days  Double vision (has 3rd nervy palsy and h/o intermittent double vision  "at baseline)  Uncontrolled HTN    Recommendations   - MRI Brain w/wo contrast w coronal DWI - if no evidence of acute stroke om imaging, no further stroke work is needed. If stroke has been ruled out on MRI, she might benefit from better BP control.     My recommendations are based on the information provided over the phone by Trinh Dobson's in-person providers. They are not intended to replace the clinical judgment of her in-person providers. I was not requested to personally see or examine the patient at this time.     Jorge James MD  Vascular Neurology    To page me or covering stroke neurology team member, click here: AMCOM  Choose \"On Call\" tab at top, then select \"NEUROLOGY/ALL SITES\" from middle drop-down box, press Enter, then look for \"stroke\" or \"telestroke\" for your site.   "

## 2025-03-12 NOTE — ED PROVIDER NOTES
Emergency Department Note      History of Present Illness     Chief Complaint   Hypertension and Dizziness      HPI   Trinh Dobson is a 44 year old female with history of hypertension, type 2 diabetes, and previous intraparenchymal hemorrhage with shunt and coil placement who presents to the ED for evaluation of hypertension and dizziness. The patient reports that she started having doubled vision earlier today around 1500. She states that she has doubled vision periodically at baseline and that her doubled vision today has been so severe that she almost fell due to dizziness associated with her impaired vision. She also endorses having a mild 3/10 frontal headache for the past hour and a half. She denies any other pain, trauma, or recent use of painkillers. She states that she is given Carvedilol regularly at home by her nurse and was told today to go to the ED due to her hypertension.      Independent Historian   None    Review of External Notes   1/14/25 Followup note for bloodpressure meds. irbesartan and amlodipine. Hx of intraparenchymal hemmhorage, aneurism shunt.    Past Medical History     Medical History and Problem List   Juvenile rheumatoid arthritis (H)   Hypertension goal BP (blood pressure) < 140/90  Abnormal pupillary function  Abnormal uterine bleeding (AUB)  Abnormality of gait  Accelerated hypertension  Seizure (H)  Anemia  Cardiomyopathy (H)  Changes in vascular appearance of retina of right eye  Cognitive deficit due to old intracerebral hemorrhage  Depression  Exotropia  Hyperlipidemia  IUD (intrauterine device) in place  Myopia  Simple endometrial hyperplasia  Subarachnoid hemorrhage (H)  Superficial punctate keratitis of both eyes  Third nerve palsy of right eye  Type 2 diabetes mellitus with obesity (H)  Intraparenchymal hemorrhage of brain (H)  CARDIOVASCULAR SCREENING; LDL GOAL LESS THAN 160  Overweight (BMI 25.0-29.9)    Medications   amLODIPine (NORVASC) 10 MG tablet  atorvastatin  "(LIPITOR) 10 MG tablet  carvedilol (COREG) 12.5 MG tablet  citalopram (CELEXA) 20 MG tablet  empagliflozin (JARDIANCE) 10 MG TABS tablet    Surgical History   Past Surgical History:   Procedure Laterality Date    IR CAROTID CEREBRAL ANGIOGRAM BILATERAL  8/11/2016    IR CAROTID CEREBRAL ANGIOGRAM BILATERAL  7/23/2015     Physical Exam     Patient Vitals for the past 24 hrs:   BP Temp Temp src Pulse Resp SpO2 Height Weight   03/12/25 0318 -- -- -- -- -- 100 % -- --   03/12/25 0316 -- -- -- -- -- 94 % -- --   03/12/25 0315 (!) 184/108 -- -- 87 -- (!) 77 % -- --   03/12/25 0100 (!) 182/96 -- -- 58 18 100 % -- --   03/12/25 0000 (!) 178/102 -- -- 73 18 99 % -- --   03/11/25 2330 (!) 193/96 -- -- -- 18 100 % -- --   03/11/25 2245 (!) 213/105 -- -- -- 18 93 % 1.499 m (4' 11\") 81.6 kg (180 lb)   03/11/25 2036 (!) 193/107 98  F (36.7  C) Temporal 64 18 100 % -- --     Physical Exam  General: The patient is alert, in no respiratory distress.    HENT: Mucous membranes moist.    Cardiovascular: Regular rate and rhythm. Good pulses in all four extremities. Normal capillary refill and skin turgor.     Respiratory: Lungs are clear. No nasal flaring. No retractions. No wheezing, no crackles.    Gastrointestinal: Abdomen soft. No guarding, no rebound. No palpable hernias.     Musculoskeletal: No gross deformity.     Skin: No rashes or petechiae.     Neurologic: The patient is alert and oriented  person in place. She did have to ask what year it was. Follows commands with clear and appropriate speech. Gives appropriate answers. Good strength in all extremities. With extraocular sight she has diplopia in the center and up without glasses. With glasses, she has diplopia to her right and up. With standing, she is ataxic reporting diplopia looking at her feet.    Lymphatic: No cervical adenopathy. No lower extremity swelling.    Psychiatric: The patient is non-tearful.  Diagnostics     Lab Results   Labs Ordered and Resulted from Time of " ED Arrival to Time of ED Departure   BASIC METABOLIC PANEL - Abnormal       Result Value    Sodium 138      Potassium 3.8      Chloride 104      Carbon Dioxide (CO2) 23      Anion Gap 11      Urea Nitrogen 11.5      Creatinine 0.82      GFR Estimate 89      Calcium 9.4      Glucose 153 (*)    ROUTINE UA WITH MICROSCOPIC - Abnormal    Color Urine Light Yellow      Appearance Urine Clear      Glucose Urine 300 (*)     Bilirubin Urine Negative      Ketones Urine Negative      Specific Gravity Urine 1.015      Blood Urine Negative      pH Urine 6.5      Protein Albumin Urine Negative      Urobilinogen Urine Normal      Nitrite Urine Negative      Leukocyte Esterase Urine Negative      Mucus Urine Present (*)     RBC Urine <1      WBC Urine 2      Squamous Epithelials Urine 3 (*)    GLUCOSE BY METER - Abnormal    GLUCOSE BY METER POCT 142 (*)    INR - Normal    INR 0.97     PARTIAL THROMBOPLASTIN TIME - Normal    aPTT 28     TROPONIN T, HIGH SENSITIVITY - Normal    Troponin T, High Sensitivity 7     HCG QUALITATIVE PREGNANCY - Normal    hCG Serum Qualitative Negative     HCG QUALITATIVE URINE - Normal    hCG Urine Qualitative Negative     URINE DRUG SCREEN PANEL - Normal    Amphetamines Urine Screen Negative      Barbituates Urine Screen Negative      Benzodiazepine Urine Screen Negative      Cannabinoids Urine Screen Negative      Cocaine Urine Screen Negative      Fentanyl Qual Urine Screen Negative      Opiates Urine Screen Negative      PCP Urine Screen Negative     TROPONIN T, HIGH SENSITIVITY - Normal    Troponin T, High Sensitivity 8     GLUCOSE MONITOR NURSING POCT   CBC WITH PLATELETS AND DIFFERENTIAL    WBC Count 9.2      RBC Count 4.70      Hemoglobin 14.0      Hematocrit 42.1      MCV 90      MCH 29.8      MCHC 33.3      RDW 13.2      Platelet Count 338      % Neutrophils 60      % Lymphocytes 31      % Monocytes 7      % Eosinophils 1      % Basophils 1      % Immature Granulocytes 0      NRBCs per 100 WBC 0       Absolute Neutrophils 5.5      Absolute Lymphocytes 2.9      Absolute Monocytes 0.7      Absolute Eosinophils 0.1      Absolute Basophils 0.1      Absolute Immature Granulocytes 0.0      Absolute NRBCs 0.0         Imaging   MR Brain w/o & w Contrast   Final Result   IMPRESSION:   1.  No acute findings.   2.  Right frontal approach ventriculostomy catheter terminating near the right foramen of Monro. Slitlike ventricles.   3.  Age-related changes with multiple chronic lacunar infarcts as described.         XR Skull 1/3vw   Final Result   IMPRESSION: The right-sided ventriculostomy catheter shunt port is noted. This is a nonprogrammable shunt valve, as noted on 12/20/2024.      XR Shunt Malfunction Survey   Final Result   IMPRESSION: Shunt tube is intact over the calvarium, neck, chest, and abdomen. No kinks or breaks. No significant incidental findings.      CT Head Perfusion w Contrast - For Tier 2 Stroke   Final Result   IMPRESSION:    Normal cerebral perfusion.      Findings were discussed with Dr. Hoskins at 2230 hours CST on 3/11/2025.      CTA Head Neck with Contrast   Final Result   IMPRESSION:    HEAD CTA:   1.  No proximal arterial occlusion.   2.  Stable irregularity of the intracranial arterial vasculature with high-grade narrowing of the KIMBERLEE branches and milder stenosis of the MCA and PCA branches.    3.  Stable 2 mm outpouching arising from the left carotid terminus.      NECK CTA:   1.  No hemodynamically significant stenosis in the neck vessels.   2.  No evidence for dissection.         Findings were discussed with Dr. Hoskins at 2215 hours CST on 03/11/2025.      CT Head w/o Contrast   Final Result   IMPRESSION:   1.  No CT evidence for acute intracranial process.   2.  Chronic changes as above.      Findings were discussed with Dr. Hoskins at 2206 hours CST on 3/11/2025.        EKG   ECG taken at 2059, ECG read at 2110  Sinus bradycardia  Minimal voltage criteria for LVH, may be normal variant    Tachycardia resolved but similar morphology as compared to prior  Rate 55 bpm. VA interval 178 ms. QRS duration 80 ms. QT/QTc 436/417 ms. P-R-T axes 41 -5 23.    Independent Interpretation   I reviewed the x-rays of the shunt series and it appears that his shunt does not appear to have any discontinuity but in looking at the chest I do not see any focal pneumonia and there is a nonspecific bowel gas pattern  Reviewed the head CT and do not see any obvious signs of bleed    ED Course      Medications Administered   Medications   iopamidol (ISOVUE-370) solution 117 mL (117 mLs Intravenous $Given 3/11/25 2200)     And   sodium chloride 0.9 % bag for CT scan flush use (95 mLs As instructed $Given 3/11/25 2203)   gadobutrol (GADAVIST) injection 8 mL (8 mLs Intravenous $Given 3/12/25 0226)       Procedures   Procedures     Discussion of Management   RadiologistTrixie reports no bleed and normal perfusion image  Stroke Neurology, Dr. Jorge James  I discussed the MRI with the MRI tech here at [facility] who said they would not do the MRI due to an unknown manufactures info regarding the coil nor the shunt  I discussed the case with the neurosurgeon ESTEVAN Thomas reports and agrees that the patient does need an MRI due to the shunt as well as presence of diplopia  I discussed the case with the neurosurgeon at the [place] [facility] [place] regarding the MRI that was performed there and options.  They did suggest calling [facility] regarding whether they could do the MRI.  I called the MRI tech at [facility] who reported that they thought that they had done a off label MRI with the aneurysm clip and that they would be unable to perform the MRI at [facility].  I did call back our MRI techs here to have them talk to the radiologist to see if an MRI would be able to be done here at [facility].  0 124 I discussed the case with the MRI tech we talked to Dr. Gross the radiologist they recommended doing a pre and post skull film to see if  the dial had moved on the shunt but otherwise were okay performing the MRI here.    ED Course   ED Course as of 03/12/25 0319   Tue Mar 11, 2025   2133 I obtained history and examined the patient as noted above.    2152 I consulted with Dr. Jorge James from Stroke neuro. Discussed patient's presentation, findings, and plan of care.     2207 I consulted with Radiology. Discussed patient's presentation, findings, and plan of care. Head CT looks negative.     2215 I consulted with [place] radiology. Discussed patient's presentation, findings, and plan of care. No large vascular occlusion. Vessels look irregular.      2227 Radiology: No stroke, want MRI    2232 Radiology: perfusion looks good   2308 Patient still has diplopia on recheck   2332 I consulted with Alina Pollack from Stroke Neuro. Patient needs MRI. Clearance needed for MRI tech due to shunt and coil.   Wed Mar 12, 2025   0004 I consulted with Dr. Maciel from [facility]. Discussed options to transfer patient.      0319 I reassessed the patient after the road test she walked without significant problems we discussed blood pressure control    Additional Documentation  Social Determinants of Health: Education/Literacy  and Social Connections/Isolation     Medical Decision Making / Diagnosis     CMS Diagnoses: The patient has stroke symptoms:         ED Stroke specific documentation           NIHSS PDF     Patient last known well time: 1400  ED Provider first to bedside at: 2133  CT Results received at: 2206    Thrombolytics:   Not given due to:   - unclear or unfavorable risk-benefit profile for extended window thrombolysis beyond the conventional 4.5 hour time window    If treating with thrombolytics: Ensure SBP<180 and DBP<105 prior to treatment with thrombolytics.  Administering thrombolytics after treatment with IV labetalol, hydralazine, or nicardipine is reasonable once BP control is established.    Endovascular Retrieval:  Not  initiated due to absence of proximal vessel occlusion    National Institutes of Health Stroke Scale (Baseline)  Time Performed: 2133     Score    Level of consciousness: (0)   Alert, keenly responsive    LOC questions: (1)   Answers one question correctly    LOC commands: (0)   Performs both tasks correctly    Best gaze: (1)   Partial gaze palsy    Visual: (0)   No visual loss    Facial palsy: (0)   Normal symmetrical movements    Motor arm (left): (0)   No drift    Motor arm (right): (0)   No drift    Motor leg (left): (0)   No drift    Motor leg (right): (0)   No drift    Limb ataxia: (2)   Present in two limbs    Sensory: (0)   Normal- no sensory loss    Best language: (0)   Normal- no aphasia    Dysarthria: (0)   Normal    Extinction and inattention: (0)   No abnormality        Total Score:  4        Stroke Mimics were considered (including migraine headache, seizure disorder, hypoglycemia (or hyperglycemia), head or spinal trauma, CNS infection, Toxin ingestion and shock state (e.g. sepsis) .        PUNEET Dobson is a 44 year old female who has a history of a previous subarachnoid hemorrhage and has a coil and a  shunt that were both placed in [place].  With her symptoms of diplopia which started today as well as mild frontal headache I did consider that this could be due to a stroke.  I did review the chart and the patient has previously had diplopia but this is a new event today and could indicate a stroke.  I also considered aneurysm.  The frontal headache was mild and I did not feel would likely be a bleed however I reviewed the CT and there is no signs of a bleed I discussed the case with the neuroradiologist as well as multiple neurosurgeons.  Arranging an MRI for the patient was quite difficult I called wilian Hurst in the  to talk to MRI and eventually was able to convince the MRI techs here to talk to the neuroradiologist who agreed to have the MRI performed here.  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  But the patient says she has not been affected by this for some time.  There is not been a recent fall there is currently no signs of a bleed the main question is is there something within the brain such as a stroke or aneurysm currently present with neurosurgery and stroke neurology who recommended an MRI which will be performed here.  MRI is reassuring her blood pressure is better than when she presented there is no signs of a stroke and the patient was requesting to go home I feel it is reasonable we discussed need for tight blood pressure control close follow-up and she was discharged home in good condition steady with walking.    Disposition   The patient was discharged.     Diagnosis     ICD-10-CM    1. Diplopia  H53.2       2. Balance problems  R26.89       3. Hypertension, unspecified type  I10            Discharge Medications   New Prescriptions    No medications on file     Scribe Disclosure:  I, Saqib Gonzales, am serving as a scribe at 9:48 PM on 3/11/2025 to document services personally performed by Tonny Hoskins MD based on my observations and the provider's statements to me.        Tonny Hoskins MD  03/12/25 5023

## 2025-03-24 ENCOUNTER — TRANSFERRED RECORDS (OUTPATIENT)
Dept: HEALTH INFORMATION MANAGEMENT | Facility: CLINIC | Age: 45
End: 2025-03-24
Payer: MEDICARE

## 2025-07-04 ENCOUNTER — APPOINTMENT (OUTPATIENT)
Dept: CT IMAGING | Facility: CLINIC | Age: 45
End: 2025-07-04
Attending: EMERGENCY MEDICINE
Payer: MEDICARE

## 2025-07-04 ENCOUNTER — HOSPITAL ENCOUNTER (EMERGENCY)
Facility: CLINIC | Age: 45
Discharge: HOME | End: 2025-07-04
Attending: EMERGENCY MEDICINE | Admitting: EMERGENCY MEDICINE
Payer: MEDICARE

## 2025-07-04 VITALS
HEART RATE: 67 BPM | OXYGEN SATURATION: 99 % | SYSTOLIC BLOOD PRESSURE: 126 MMHG | RESPIRATION RATE: 20 BRPM | TEMPERATURE: 98 F | DIASTOLIC BLOOD PRESSURE: 71 MMHG

## 2025-07-04 DIAGNOSIS — R55 ATYPICAL SYNCOPE: ICD-10-CM

## 2025-07-04 LAB
ALBUMIN SERPL BCG-MCNC: 4.2 G/DL (ref 3.5–5.2)
ALP SERPL-CCNC: 180 U/L (ref 40–150)
ALT SERPL W P-5'-P-CCNC: 24 U/L (ref 0–50)
ANION GAP SERPL CALCULATED.3IONS-SCNC: 13 MMOL/L (ref 7–15)
AST SERPL W P-5'-P-CCNC: 14 U/L (ref 0–45)
BASOPHILS # BLD AUTO: 0.1 10E3/UL (ref 0–0.2)
BASOPHILS NFR BLD AUTO: 1 %
BILIRUB SERPL-MCNC: 0.6 MG/DL
BUN SERPL-MCNC: 10.2 MG/DL (ref 6–20)
CALCIUM SERPL-MCNC: 9 MG/DL (ref 8.8–10.4)
CHLORIDE SERPL-SCNC: 103 MMOL/L (ref 98–107)
CREAT SERPL-MCNC: 0.85 MG/DL (ref 0.51–0.95)
EGFRCR SERPLBLD CKD-EPI 2021: 85 ML/MIN/1.73M2
EOSINOPHIL # BLD AUTO: 0.1 10E3/UL (ref 0–0.7)
EOSINOPHIL NFR BLD AUTO: 1 %
ERYTHROCYTE [DISTWIDTH] IN BLOOD BY AUTOMATED COUNT: 13.1 % (ref 10–15)
ETHANOL SERPL-MCNC: <0.01 G/DL
GLUCOSE SERPL-MCNC: 197 MG/DL (ref 70–99)
HCG SERPL QL: NEGATIVE
HCO3 SERPL-SCNC: 23 MMOL/L (ref 22–29)
HCT VFR BLD AUTO: 43.8 % (ref 35–47)
HGB BLD-MCNC: 14.4 G/DL (ref 11.7–15.7)
HOLD SPECIMEN: NORMAL
IMM GRANULOCYTES # BLD: 0 10E3/UL
IMM GRANULOCYTES NFR BLD: 0 %
LYMPHOCYTES # BLD AUTO: 2.7 10E3/UL (ref 0.8–5.3)
LYMPHOCYTES NFR BLD AUTO: 30 %
MCH RBC QN AUTO: 29.2 PG (ref 26.5–33)
MCHC RBC AUTO-ENTMCNC: 32.9 G/DL (ref 31.5–36.5)
MCV RBC AUTO: 89 FL (ref 78–100)
MONOCYTES # BLD AUTO: 0.6 10E3/UL (ref 0–1.3)
MONOCYTES NFR BLD AUTO: 7 %
NEUTROPHILS # BLD AUTO: 5.6 10E3/UL (ref 1.6–8.3)
NEUTROPHILS NFR BLD AUTO: 61 %
NRBC # BLD AUTO: 0 10E3/UL
NRBC BLD AUTO-RTO: 0 /100
PLATELET # BLD AUTO: 317 10E3/UL (ref 150–450)
POTASSIUM SERPL-SCNC: 3.5 MMOL/L (ref 3.4–5.3)
PROT SERPL-MCNC: 8 G/DL (ref 6.4–8.3)
RBC # BLD AUTO: 4.93 10E6/UL (ref 3.8–5.2)
SODIUM SERPL-SCNC: 139 MMOL/L (ref 135–145)
TROPONIN T SERPL HS-MCNC: 8 NG/L
WBC # BLD AUTO: 9.1 10E3/UL (ref 4–11)

## 2025-07-04 PROCEDURE — 36415 COLL VENOUS BLD VENIPUNCTURE: CPT | Performed by: EMERGENCY MEDICINE

## 2025-07-04 PROCEDURE — 258N000003 HC RX IP 258 OP 636: Performed by: EMERGENCY MEDICINE

## 2025-07-04 PROCEDURE — 250N000011 HC RX IP 250 OP 636: Performed by: EMERGENCY MEDICINE

## 2025-07-04 PROCEDURE — 85025 COMPLETE CBC W/AUTO DIFF WBC: CPT | Performed by: EMERGENCY MEDICINE

## 2025-07-04 PROCEDURE — 96361 HYDRATE IV INFUSION ADD-ON: CPT

## 2025-07-04 PROCEDURE — 99285 EMERGENCY DEPT VISIT HI MDM: CPT | Mod: 25

## 2025-07-04 PROCEDURE — 250N000009 HC RX 250: Performed by: EMERGENCY MEDICINE

## 2025-07-04 PROCEDURE — 80053 COMPREHEN METABOLIC PANEL: CPT | Performed by: EMERGENCY MEDICINE

## 2025-07-04 PROCEDURE — 84703 CHORIONIC GONADOTROPIN ASSAY: CPT | Performed by: EMERGENCY MEDICINE

## 2025-07-04 PROCEDURE — 70450 CT HEAD/BRAIN W/O DYE: CPT

## 2025-07-04 PROCEDURE — 70496 CT ANGIOGRAPHY HEAD: CPT

## 2025-07-04 PROCEDURE — 96360 HYDRATION IV INFUSION INIT: CPT | Mod: 59

## 2025-07-04 PROCEDURE — 93005 ELECTROCARDIOGRAM TRACING: CPT

## 2025-07-04 PROCEDURE — 84484 ASSAY OF TROPONIN QUANT: CPT | Performed by: EMERGENCY MEDICINE

## 2025-07-04 PROCEDURE — 82077 ASSAY SPEC XCP UR&BREATH IA: CPT | Performed by: EMERGENCY MEDICINE

## 2025-07-04 RX ORDER — IOPAMIDOL 755 MG/ML
500 INJECTION, SOLUTION INTRAVASCULAR ONCE
Status: COMPLETED | OUTPATIENT
Start: 2025-07-04 | End: 2025-07-04

## 2025-07-04 RX ADMIN — SODIUM CHLORIDE 500 ML: 0.9 INJECTION, SOLUTION INTRAVENOUS at 22:09

## 2025-07-04 RX ADMIN — IOPAMIDOL 67 ML: 755 INJECTION, SOLUTION INTRAVENOUS at 22:22

## 2025-07-04 RX ADMIN — SODIUM CHLORIDE 80 ML: 9 INJECTION, SOLUTION INTRAVENOUS at 22:22

## 2025-07-04 ASSESSMENT — ACTIVITIES OF DAILY LIVING (ADL)
ADLS_ACUITY_SCORE: 57
ADLS_ACUITY_SCORE: 57

## 2025-07-05 NOTE — ED PROVIDER NOTES
History     Chief Complaint:  Altered Mental Status and Syncope       HPI   Trinh Dobson is a 45 year old female dinner at 830pm some slowness to respond felt like vomiting had 1 episode then eyes rolled back 3-4 min, that was initial history from the cousin however the cousins that she was very worked up very emotional when she got here now on recollection it was probably only 3 to 4 seconds, regained consciousness then in the bathroom syncopal 3-4 secs.  Some confusion disorientation.  Baseline cognitive deficits.  No description of any seizure-like activity patient has complete disruption of her sleep cycle stays up all night long goes to bed around 9 to 10 PM today she did not go to bed as she normally would and has been sleepy all day only took a nap from 12-1 30.  No focal neurosymptoms otherwise no fever no chest pain no cough no abdominal pain no other episodes of vomiting no diarrhea no dysuria hematuria odor of the urine no leg swelling rashes or bites      Independent Historian:    Cousin accompanies patient near syncope episode and went to the bathroom and had eyes rolling back and had episode    Review of External Notes:  Reviewed many visits for multiple complaints back to March 11 and the ER visit note at that time    Medications:    amLODIPine (NORVASC) 10 MG tablet  atorvastatin (LIPITOR) 10 MG tablet  carvedilol (COREG) 12.5 MG tablet  citalopram (CELEXA) 20 MG tablet  empagliflozin (JARDIANCE) 10 MG TABS tablet        Past Medical History:    Past Medical History:   Diagnosis Date    Hypertension     Intraparenchymal hemorrhage of brain (H) 10/31/2024       Past Surgical History:    Past Surgical History:   Procedure Laterality Date    IR CAROTID CEREBRAL ANGIOGRAM BILATERAL  8/11/2016    IR CAROTID CEREBRAL ANGIOGRAM BILATERAL  7/23/2015          Physical Exam   Patient Vitals for the past 24 hrs:   BP Temp Pulse Resp SpO2   07/04/25 2304 -- -- 64 19 99 %   07/04/25 2300 125/70 -- 65 19 --    07/04/25 2201 126/74 -- 58 20 98 %   07/04/25 2200 -- 98  F (36.7  C) -- -- --   07/04/25 2157 127/76 -- 54 18 94 %        Physical Exam  General: Patient is well appearing. No distress.  Head: Atraumatic.  Eyes: Conjunctivae and EOM are normal. No scleral icterus.  Neck: Normal range of motion. Neck supple.   Cardiovascular: Normal rate, regular rhythm, normal heart sounds and intact distal pulses.   Pulmonary/Chest: Breath sounds normal. No respiratory distress.  Abdominal: Soft. Bowel sounds are normal. No distension. No tenderness. No rebound or guarding.   Musculoskeletal: Normal range of motion.  Neuro: Alert, conversant, PERRL, EOMI, CN 2-7 and 9-12 intact, 5/5 grasp BUE, 5/5 elbow flexion and extension BUE, 5/5 shoulder abduction BUE, 5/5 hip flexion, knee flexion, knee extension, plantar and dorsiflexion BLE, no pronator drift, normal gait   Skin: Warm and dry. No rash noted. Not diaphoretic.      Emergency Department Course   ECG  Sinus bradycardia heart rate 57 no acute injury pattern morphology essentially unchanged from March    Imaging:  CTA Head Neck w Contrast   Final Result   IMPRESSION:    HEAD CT:   1.  No acute intracranial process.   2.  Right frontal approach ventriculostomy catheter remains in place. The ventricular system remains nearly completely decompressed.   3.  Redemonstration of small areas of encephalomalacia in the left cerebellar hemisphere and right occipital lobe.      HEAD CTA:   1.  No large vessel occlusion or flow-limiting stenosis.   2.  Redemonstration of multifocal areas of irregularity and narrowing in the anterior and posterior circulation.   3.  Stable 3 mm aneurysm arising from the distal left internal carotid artery.      NECK CTA:   No measurable stenosis or dissection.         CT Head w/o Contrast   Final Result   IMPRESSION:    HEAD CT:   1.  No acute intracranial process.   2.  Right frontal approach ventriculostomy catheter remains in place. The ventricular  system remains nearly completely decompressed.   3.  Redemonstration of small areas of encephalomalacia in the left cerebellar hemisphere and right occipital lobe.      HEAD CTA:   1.  No large vessel occlusion or flow-limiting stenosis.   2.  Redemonstration of multifocal areas of irregularity and narrowing in the anterior and posterior circulation.   3.  Stable 3 mm aneurysm arising from the distal left internal carotid artery.      NECK CTA:   No measurable stenosis or dissection.             Laboratory:  Labs Ordered and Resulted from Time of ED Arrival to Time of ED Departure   COMPREHENSIVE METABOLIC PANEL - Abnormal       Result Value    Sodium 139      Potassium 3.5      Carbon Dioxide (CO2) 23      Anion Gap 13      Urea Nitrogen 10.2      Creatinine 0.85      GFR Estimate 85      Calcium 9.0      Chloride 103      Glucose 197 (*)     Alkaline Phosphatase 180 (*)     AST 14      ALT 24      Protein Total 8.0      Albumin 4.2      Bilirubin Total 0.6     TROPONIN T, HIGH SENSITIVITY - Normal    Troponin T, High Sensitivity 8     HCG QUALITATIVE PREGNANCY - Normal    hCG Serum Qualitative Negative     ETHANOL LEVEL BLOOD - Normal    Ethanol Level Blood <0.01     CBC WITH PLATELETS AND DIFFERENTIAL    WBC Count 9.1      RBC Count 4.93      Hemoglobin 14.4      Hematocrit 43.8      MCV 89      MCH 29.2      MCHC 32.9      RDW 13.1      Platelet Count 317      % Neutrophils 61      % Lymphocytes 30      % Monocytes 7      % Eosinophils 1      % Basophils 1      % Immature Granulocytes 0      NRBCs per 100 WBC 0      Absolute Neutrophils 5.6      Absolute Lymphocytes 2.7      Absolute Monocytes 0.6      Absolute Eosinophils 0.1      Absolute Basophils 0.1      Absolute Immature Granulocytes 0.0      Absolute NRBCs 0.0     TROPONIN T, HIGH SENSITIVITY        Procedures       Emergency Department Course & Assessments:    Interventions:  Medications   sodium chloride 0.9% BOLUS 500 mL (500 mLs Intravenous $New Bag  7/4/25 2209)   iopamidol (ISOVUE-370) solution 500 mL (67 mLs Intravenous $Given 7/4/25 2222)   sodium chloride 0.9 % bag for CT scan flush (80 mLs Intravenous $Given 7/4/25 2222)        Assessments:      Independent Interpretation (X-rays, CTs, rhythm strip):  CT head no large bleed mass or infarct new ventriculostomy appears intact in areas of encephalomalacia unchanged CTA no large new dissection obstruction known chronic vascular narrowing    Consultations/Discussion of Management or Tests:         Social Drivers of Health affecting care:       Disposition:  The patient was discharged.    Impression & Plan           Medical Decision Making:  Patient is 45-year-old female with very nondescript episode of sleepiness versus syncope considering now the history of complete disruption of sleep cycles along with her chronic underlying cognitive deficits and that she had an episode of vomiting this could be for variety of vasovagal sleep disturbance causes there does not appear to be any seizure activity reported her EKG cardiovascular workup as well as vital signs and CBC and differential and CMP are all within normal limits and reassuring cousin feels much more comfortable with her at this time and is her PCA she road test here without any symptoms and feels well they are comfortable with going home and discussed strict return and follow-up instructions    Diagnosis:    ICD-10-CM    1. Atypical syncope  R55            Discharge Medications:  New Prescriptions    No medications on file            7/4/2025   Willian Carr MD Stevens, Andrew C, MD  07/04/25 4142

## 2025-07-05 NOTE — ED TRIAGE NOTES
Arrives via EMS from home. Family states today since dinner pt has been slower to respond, hx of cognitive deficits from previous stroke but family states she was more off than her baseline. Then at dinner table had a syncopal episode lasting 3-4 minutes, came to and went to the bathroom and had another syncopal episode lasting again 3-4 minutes.   Pt endorses blurry vision and Numbness to fingers. Pt is slow to respond for RN with possible slur. Hx of stroke and extensive cardiac issues per EMS. Pt is alert to self and time only. ABCs intact   hx type 2 dm      Family at bedside at states noticed symptoms began at 2030

## 2025-07-07 LAB
ATRIAL RATE - MUSE: 57 BPM
DIASTOLIC BLOOD PRESSURE - MUSE: NORMAL MMHG
INTERPRETATION ECG - MUSE: NORMAL
P AXIS - MUSE: 29 DEGREES
PR INTERVAL - MUSE: 176 MS
QRS DURATION - MUSE: 74 MS
QT - MUSE: 428 MS
QTC - MUSE: 416 MS
R AXIS - MUSE: -9 DEGREES
SYSTOLIC BLOOD PRESSURE - MUSE: NORMAL MMHG
T AXIS - MUSE: 85 DEGREES
VENTRICULAR RATE- MUSE: 57 BPM